# Patient Record
Sex: FEMALE | Race: OTHER | Employment: UNEMPLOYED | URBAN - METROPOLITAN AREA
[De-identification: names, ages, dates, MRNs, and addresses within clinical notes are randomized per-mention and may not be internally consistent; named-entity substitution may affect disease eponyms.]

---

## 2017-01-09 ENCOUNTER — TELEPHONE (OUTPATIENT)
Dept: PULMONOLOGY | Facility: CLINIC | Age: 57
End: 2017-01-09

## 2017-01-09 DIAGNOSIS — C73 THYROID CANCER (HCC): Primary | ICD-10-CM

## 2017-01-09 NOTE — TELEPHONE ENCOUNTER
Verbal order from Dr. Tammie Marrero to order CT scan of neck (soft tissue) NO contrast due Feb 6th 2017. Order put in [address] [place] [address] Rd. Patient's daughter notified.

## 2017-01-16 ENCOUNTER — TELEPHONE (OUTPATIENT)
Dept: OTOLARYNGOLOGY | Facility: CLINIC | Age: 57
End: 2017-01-16

## 2017-01-16 NOTE — TELEPHONE ENCOUNTER
I saw an order for a neck CT without contrast ordered on 1/9 by Yoli Jordan. Do you need another order for any scans?

## 2017-01-17 ENCOUNTER — OFFICE VISIT (OUTPATIENT)
Dept: OTOLARYNGOLOGY | Facility: CLINIC | Age: 57
End: 2017-01-17

## 2017-01-17 VITALS
WEIGHT: 118 LBS | HEIGHT: 60 IN | DIASTOLIC BLOOD PRESSURE: 70 MMHG | BODY MASS INDEX: 23.16 KG/M2 | TEMPERATURE: 97 F | SYSTOLIC BLOOD PRESSURE: 90 MMHG

## 2017-01-17 DIAGNOSIS — C80.1 PAPILLARY CARCINOMA (HCC): Primary | ICD-10-CM

## 2017-01-17 PROCEDURE — 99214 OFFICE O/P EST MOD 30 MIN: CPT | Performed by: OTOLARYNGOLOGY

## 2017-01-17 PROCEDURE — 99212 OFFICE O/P EST SF 10 MIN: CPT | Performed by: OTOLARYNGOLOGY

## 2017-01-17 NOTE — PROGRESS NOTES
Jose Prater is a 55year old female. Patient presents with:   Follow - Up: 3 month follow up-thyroid cancer- pt c/o pain at her neck since wednesday      HISTORY OF PRESENT ILLNESS    11/19/15 She presents with a history of papillary carcinoma of intervention. She is otherwise asymptomatic. No recent thyroglobulin levels noted. 10/4/16 Since I last saw her she underwent a CT scan of her neck. I personally reviewed the study and went over the results with her in the office.  She does have a previou on the initial   diagnostic scan. There is no evidence of distant disease. There is only   0.3 % uptake in the thyroid bed. IMPRESSION:   Abnormal whole body iodine scan.  There has been significant interval   increase in the activity in the right neck co measuring 10 x 13 mm. No   significant lymphadenopathy in the visualized right   clavicular region. The largest nodes in this region   measures 5 x 7 mm. VASCULATURE: Limited views are unremarkable.    SINUSES: Limited views show no significant fluid or m at 8:18 Approved by   (CST): Adela Sears MD on 9/29/2016 at 8:48     1/17/17 Last visit no Masses of the neck were noted but she did have a nuclear scan suggesting a right-sided abnormality.  She did have a CT scan demonstrating something in her right lung Constitutional Normal Overall appearance - Normal.   Psychiatric Normal Orientation - Oriented to time, place, person & situation. Appropriate mood and affect.    Neck Exam Normal Inspection - Normal. Palpation - Normal. Parotid gland - Normal. Thyroid gl

## 2017-02-02 ENCOUNTER — HOSPITAL ENCOUNTER (OUTPATIENT)
Dept: NUCLEAR MEDICINE | Facility: HOSPITAL | Age: 57
Discharge: HOME | End: 2017-02-02
Attending: INTERNAL MEDICINE
Payer: COMMERCIAL

## 2017-02-02 ENCOUNTER — HOSPITAL ENCOUNTER (OUTPATIENT)
Dept: CT IMAGING | Facility: HOSPITAL | Age: 57
Discharge: HOME | End: 2017-02-02
Attending: INTERNAL MEDICINE
Payer: COMMERCIAL

## 2017-02-02 DIAGNOSIS — C73 THYROID CANCER (HCC): ICD-10-CM

## 2017-02-02 DIAGNOSIS — C73 PAPILLARY CARCINOMA OF THYROID (HCC): ICD-10-CM

## 2017-02-02 PROCEDURE — A4216 STERILE WATER/SALINE, 10 ML: HCPCS

## 2017-02-02 PROCEDURE — 70490 CT SOFT TISSUE NECK W/O DYE: CPT

## 2017-02-02 NOTE — IMAGING NOTE
Thyrogen . 9 mg IM left glut @ 0815. Patient has had prior injection and aware of precautions. Daughter with her to interpret. Lot #[id number] exp 3/19

## 2017-02-03 ENCOUNTER — HOSPITAL ENCOUNTER (OUTPATIENT)
Dept: NUCLEAR MEDICINE | Facility: HOSPITAL | Age: 57
Discharge: HOME | End: 2017-02-03
Attending: INTERNAL MEDICINE
Payer: COMMERCIAL

## 2017-02-04 ENCOUNTER — HOSPITAL ENCOUNTER (OUTPATIENT)
Dept: NUCLEAR MEDICINE | Facility: HOSPITAL | Age: 57
Discharge: HOME | End: 2017-02-04
Attending: INTERNAL MEDICINE
Payer: COMMERCIAL

## 2017-02-06 ENCOUNTER — HOSPITAL ENCOUNTER (OUTPATIENT)
Dept: NUCLEAR MEDICINE | Facility: HOSPITAL | Age: 57
Discharge: HOME | End: 2017-02-06
Attending: INTERNAL MEDICINE
Payer: COMMERCIAL

## 2017-02-06 DIAGNOSIS — C73 THYROID CA (HCC): ICD-10-CM

## 2017-02-06 PROCEDURE — 78018 THYROID MET IMAGING BODY: CPT

## 2017-02-06 PROCEDURE — 36415 COLL VENOUS BLD VENIPUNCTURE: CPT | Performed by: NUCLEAR MEDICINE

## 2017-02-06 PROCEDURE — 86800 THYROGLOBULIN ANTIBODY: CPT | Performed by: NUCLEAR MEDICINE

## 2017-02-06 PROCEDURE — 84432 ASSAY OF THYROGLOBULIN: CPT | Performed by: NUCLEAR MEDICINE

## 2017-02-07 LAB
THYROGLOBULIN ANTIBODY: 10 IU/ML
THYROGLOBULIN TUMOR MARKER: <0.1 NG/ML

## 2017-02-13 ENCOUNTER — TELEPHONE (OUTPATIENT)
Dept: PULMONOLOGY | Facility: CLINIC | Age: 57
End: 2017-02-13

## 2017-02-13 DIAGNOSIS — R79.1 ABNORMAL COAGULATION PROFILE: ICD-10-CM

## 2017-02-13 DIAGNOSIS — J98.4 APICAL LUNG SCARRING: Primary | ICD-10-CM

## 2017-02-13 NOTE — TELEPHONE ENCOUNTER
Verbal Order from Dr. Freire Roll CT Guided Biopsy Right [place] of Lung as identified on SPECT. Order placed for PT/INR, PTT/ CBC w/ Plt and CXR. Msg routed to [facility].

## 2017-02-13 NOTE — TELEPHONE ENCOUNTER
Nikolaie 6 called re: orders for biopsy left on IR voicemail. Adelene Feeling needs orders and clarification on what is being scheduled. Msg left for [facility] to advise, no orders of defined type in epic.

## 2017-02-16 ENCOUNTER — TELEPHONE (OUTPATIENT)
Dept: ENDOCRINOLOGY CLINIC | Facility: CLINIC | Age: 57
End: 2017-02-16

## 2017-02-16 NOTE — TELEPHONE ENCOUNTER
Called and spoke with daughter Anamika Moses- there is consent to release information to daughter in chart. Let her know below. Booked for appt 3/8 in [facility]. Directions to clinic provided.

## 2017-02-16 NOTE — TELEPHONE ENCOUNTER
Received message from Dr. Karlo Nevarez regarding recurrent papillary thyroid CA in patient, she is scheduled to get repeat I-131 therapy on 2/27. Please schedule her for a consult appointment the week of March 6th. Ok to use MD approval spot. Thanks.      GRABIEL

## 2017-02-27 ENCOUNTER — TELEPHONE (OUTPATIENT)
Dept: PULMONOLOGY | Facility: CLINIC | Age: 57
End: 2017-02-27

## 2017-02-27 DIAGNOSIS — C73 RECURRENT THYROID CANCER (HCC): Primary | ICD-10-CM

## 2017-02-27 NOTE — TELEPHONE ENCOUNTER
Ok per Dr. Lujan Neighbor to enter order in [facility] for Thyroid Ablation. Order entered in system and routed to [facility] for sign off. Cash Sauceda from Radiology notified.

## 2017-02-27 NOTE — TELEPHONE ENCOUNTER
Meron/Radiology stts she needs an order for Thyroid ablation. Genemca Downer procedure is scheduled for March 1 2017. Ivan Pandya stts the procedure was changed per pt. Pt has an order for CT guided Biopsy. Please advise.

## 2017-03-01 ENCOUNTER — HOSPITAL ENCOUNTER (OUTPATIENT)
Dept: NUCLEAR MEDICINE | Facility: HOSPITAL | Age: 57
Discharge: HOME | End: 2017-03-01
Attending: INTERNAL MEDICINE
Payer: COMMERCIAL

## 2017-03-01 DIAGNOSIS — C73 RECURRENT THYROID CANCER (HCC): ICD-10-CM

## 2017-03-01 PROCEDURE — 79005 NUCLEAR RX ORAL ADMIN: CPT

## 2017-03-06 ENCOUNTER — HOSPITAL ENCOUNTER (OUTPATIENT)
Dept: NUCLEAR MEDICINE | Facility: HOSPITAL | Age: 57
Discharge: HOME | End: 2017-03-06
Attending: INTERNAL MEDICINE
Payer: COMMERCIAL

## 2017-03-06 DIAGNOSIS — C73 THYROID CANCER (HCC): ICD-10-CM

## 2017-03-06 PROCEDURE — 78018 THYROID MET IMAGING BODY: CPT

## 2017-03-08 ENCOUNTER — OFFICE VISIT (OUTPATIENT)
Dept: ENDOCRINOLOGY CLINIC | Facility: CLINIC | Age: 57
End: 2017-03-08

## 2017-03-08 VITALS
SYSTOLIC BLOOD PRESSURE: 121 MMHG | WEIGHT: 109 LBS | DIASTOLIC BLOOD PRESSURE: 74 MMHG | RESPIRATION RATE: 20 BRPM | HEIGHT: 60 IN | BODY MASS INDEX: 21.4 KG/M2 | HEART RATE: 71 BPM | TEMPERATURE: 98 F

## 2017-03-08 DIAGNOSIS — C73 THYROID CANCER (HCC): Primary | ICD-10-CM

## 2017-03-08 PROCEDURE — 99212 OFFICE O/P EST SF 10 MIN: CPT | Performed by: INTERNAL MEDICINE

## 2017-03-08 PROCEDURE — 99243 OFF/OP CNSLTJ NEW/EST LOW 30: CPT | Performed by: INTERNAL MEDICINE

## 2017-03-08 NOTE — PROGRESS NOTES
Name: Prakash Jean  Date: 3/8/2017    Referring Physician: No ref. provider found    Patient presents with:  Consult: New pt who underwent Thyroidectomy on 07/2016 due to thyroid Cancer. Radiation Therapy completed last week.  Currently on low Iodi problems  Musculoskeletal: no muscle pain or arthralgia  /Gyne: no frequency or discomfort while urinating  Psychiatric:  no acute distress, anxiety  or depression  Skin: normal moisturized skin    Medications:     Current outpatient prescriptions:   • and skin texture  Hair & Nails:  normal scalp hair     Neuro:  sensory grossly intact and motor grossly intact  Psychiatric:  oriented to time, self, and place  Nutritional:  no abnormal weight gain or loss    ASSESSMENT/PLAN:    1.  Papillary thyroid cance

## 2017-03-20 ENCOUNTER — TELEPHONE (OUTPATIENT)
Dept: PULMONOLOGY | Facility: CLINIC | Age: 57
End: 2017-03-20

## 2017-03-20 DIAGNOSIS — R91.1 PULMONARY NODULE: Primary | ICD-10-CM

## 2017-03-20 NOTE — TELEPHONE ENCOUNTER
V/O from Dr. Zavala Otto to cancel CT guided bx of the right apex lung and chest CT without contrast due April 2017 for pulmonary nodule. Biospy orders canceled.

## 2017-03-20 NOTE — TELEPHONE ENCOUNTER
Daughter Cooper Castillo notified of orders and given managed care phone # [phone]. Shruti notified managed care will do PA and call her when PA is complete. Daughter notified to call managed care if she does not hear from them within a few days.   [facility] care, p

## 2017-03-29 ENCOUNTER — TELEPHONE (OUTPATIENT)
Dept: PULMONOLOGY | Facility: CLINIC | Age: 57
End: 2017-03-29

## 2017-03-29 NOTE — TELEPHONE ENCOUNTER
After office notes and imaging results were faxed over to [id number] the CT of the chest without contrast was denied unless a peer to peer can be performed by the ordering physician by calling    [phone]     With reference # [id number]    Thank You

## 2017-03-29 NOTE — TELEPHONE ENCOUNTER
Spoke to Durene Severin [facility], PA may have been denied for being too early. Pt is not due for Chest CT until 4-20-17. We will forward PA request to [facility] closer to due date. Thank you.

## 2017-04-24 ENCOUNTER — APPOINTMENT (OUTPATIENT)
Dept: LAB | Facility: HOSPITAL | Age: 57
End: 2017-04-24
Attending: INTERNAL MEDICINE
Payer: COMMERCIAL

## 2017-04-24 ENCOUNTER — HOSPITAL ENCOUNTER (OUTPATIENT)
Dept: ULTRASOUND IMAGING | Facility: HOSPITAL | Age: 57
Discharge: HOME | End: 2017-04-24
Attending: INTERNAL MEDICINE
Payer: COMMERCIAL

## 2017-04-24 DIAGNOSIS — C73 THYROID CANCER (HCC): ICD-10-CM

## 2017-04-24 PROCEDURE — 86800 THYROGLOBULIN ANTIBODY: CPT

## 2017-04-24 PROCEDURE — 36415 COLL VENOUS BLD VENIPUNCTURE: CPT

## 2017-04-24 PROCEDURE — 84439 ASSAY OF FREE THYROXINE: CPT

## 2017-04-24 PROCEDURE — 84432 ASSAY OF THYROGLOBULIN: CPT

## 2017-04-24 PROCEDURE — 76536 US EXAM OF HEAD AND NECK: CPT

## 2017-04-24 PROCEDURE — 84443 ASSAY THYROID STIM HORMONE: CPT

## 2017-05-10 ENCOUNTER — OFFICE VISIT (OUTPATIENT)
Dept: ENDOCRINOLOGY CLINIC | Facility: CLINIC | Age: 57
End: 2017-05-10

## 2017-05-10 VITALS
BODY MASS INDEX: 21.52 KG/M2 | SYSTOLIC BLOOD PRESSURE: 118 MMHG | WEIGHT: 109.63 LBS | DIASTOLIC BLOOD PRESSURE: 72 MMHG | HEART RATE: 79 BPM | HEIGHT: 60 IN

## 2017-05-10 DIAGNOSIS — Z85.850 HISTORY OF THYROID CANCER: ICD-10-CM

## 2017-05-10 DIAGNOSIS — E89.0 POSTOPERATIVE HYPOTHYROIDISM: Primary | ICD-10-CM

## 2017-05-10 PROCEDURE — 99212 OFFICE O/P EST SF 10 MIN: CPT | Performed by: INTERNAL MEDICINE

## 2017-05-10 PROCEDURE — 99213 OFFICE O/P EST LOW 20 MIN: CPT | Performed by: INTERNAL MEDICINE

## 2017-05-10 NOTE — PROGRESS NOTES
Name: Elio Baez  Date: 5/10/2017    Referring Physician: No ref.  provider found    Patient presents with:  Thyroid Problem: thyroid cancer      HISTORY OF PRESENT ILLNESS   Elio Baez is a 65year old female who presents for Patient prescriptions:   •  Levothyroxine Sodium (SYNTHROID, LEVOTHROID) 125 MCG Oral Tab, Take 112 mcg by mouth before breakfast.  , Disp: , Rfl:   •  Atorvastatin Calcium (LIPITOR) 10 MG Oral Tab, Take 10 mg by mouth nightly., Disp: , Rfl:      Allergies:      Io importance of long term follow up with thyroglobulin level and thyroid ultrasound  -Discussed importance of long term TSH suppression  -She has now received 2nd dose of LINARES due to recurrence  -Goal TSH <0.1 given stage 3 disease  -Continue LT4 112mcg PO da

## 2017-09-12 ENCOUNTER — TELEPHONE (OUTPATIENT)
Dept: PULMONOLOGY | Facility: CLINIC | Age: 57
End: 2017-09-12

## 2017-09-12 NOTE — TELEPHONE ENCOUNTER
Pts daugher indicates pt received a letter for CT Chest orders and daughter wants clarification if the orders are in or do they need to be put in by Dr. Abigail Mayer? Pls call at:[phone],thanks.

## 2017-09-13 NOTE — TELEPHONE ENCOUNTER
Informed pt's daughter of below. Explained pa required for CT, Managed Care will contact pt once pa obtained to notify her to proceed in scheduling CT, & they may f/u w/ Managed Care in 10 business days if they don't hear from them.  Phone #s for [facility]

## 2017-09-13 NOTE — TELEPHONE ENCOUNTER
Requested that Ty in [facility] work on obtaining prior [facility]. Explained rx was never cancelled for CT Chest ordered 3/21/17 & to let us know if she needs a new rx b/c they cancelled referral. She verbalized understanding & was agreeable.

## 2017-10-01 ENCOUNTER — HOSPITAL ENCOUNTER (OUTPATIENT)
Dept: CT IMAGING | Facility: HOSPITAL | Age: 57
Discharge: HOME | End: 2017-10-01
Attending: INTERNAL MEDICINE
Payer: COMMERCIAL

## 2017-10-01 DIAGNOSIS — R91.1 PULMONARY NODULE: ICD-10-CM

## 2017-10-01 PROCEDURE — 71250 CT THORAX DX C-: CPT | Performed by: INTERNAL MEDICINE

## 2017-10-05 ENCOUNTER — TELEPHONE (OUTPATIENT)
Dept: PULMONOLOGY | Facility: CLINIC | Age: 57
End: 2017-10-05

## 2017-10-05 DIAGNOSIS — R91.1 PULMONARY NODULE, LEFT: Primary | ICD-10-CM

## 2017-10-05 DIAGNOSIS — Z86.11 HISTORY OF TB (TUBERCULOSIS): ICD-10-CM

## 2017-10-06 NOTE — TELEPHONE ENCOUNTER
RN, I spoke with the patient's daughter regarding the results of the test.  She acts as an  for the mother. The CAT scan is stable.   Please add to the calendar a repeat CT scan of the chest at the one-year interval.

## 2017-11-11 ENCOUNTER — APPOINTMENT (OUTPATIENT)
Dept: LAB | Facility: HOSPITAL | Age: 57
End: 2017-11-11
Attending: INTERNAL MEDICINE
Payer: COMMERCIAL

## 2017-11-11 ENCOUNTER — HOSPITAL ENCOUNTER (OUTPATIENT)
Dept: ULTRASOUND IMAGING | Facility: HOSPITAL | Age: 57
Discharge: HOME | End: 2017-11-11
Attending: INTERNAL MEDICINE
Payer: COMMERCIAL

## 2017-11-11 DIAGNOSIS — E89.0 POSTOPERATIVE HYPOTHYROIDISM: ICD-10-CM

## 2017-11-11 DIAGNOSIS — Z85.850 HISTORY OF THYROID CANCER: ICD-10-CM

## 2017-11-11 PROCEDURE — 86800 THYROGLOBULIN ANTIBODY: CPT

## 2017-11-11 PROCEDURE — 36415 COLL VENOUS BLD VENIPUNCTURE: CPT

## 2017-11-11 PROCEDURE — 84443 ASSAY THYROID STIM HORMONE: CPT

## 2017-11-11 PROCEDURE — 84432 ASSAY OF THYROGLOBULIN: CPT

## 2017-11-11 PROCEDURE — 76536 US EXAM OF HEAD AND NECK: CPT | Performed by: INTERNAL MEDICINE

## 2017-11-11 PROCEDURE — 84439 ASSAY OF FREE THYROXINE: CPT

## 2017-11-15 ENCOUNTER — OFFICE VISIT (OUTPATIENT)
Dept: ENDOCRINOLOGY CLINIC | Facility: CLINIC | Age: 57
End: 2017-11-15

## 2017-11-15 VITALS
HEIGHT: 60 IN | HEART RATE: 73 BPM | SYSTOLIC BLOOD PRESSURE: 138 MMHG | BODY MASS INDEX: 21.67 KG/M2 | WEIGHT: 110.38 LBS | DIASTOLIC BLOOD PRESSURE: 80 MMHG

## 2017-11-15 DIAGNOSIS — C73 THYROID CANCER (HCC): ICD-10-CM

## 2017-11-15 DIAGNOSIS — E89.0 POSTOPERATIVE HYPOTHYROIDISM: Primary | ICD-10-CM

## 2017-11-15 PROCEDURE — 99214 OFFICE O/P EST MOD 30 MIN: CPT | Performed by: INTERNAL MEDICINE

## 2017-11-15 PROCEDURE — 99212 OFFICE O/P EST SF 10 MIN: CPT | Performed by: INTERNAL MEDICINE

## 2017-11-15 NOTE — PROGRESS NOTES
Name: Edy Abernathy  Date: 11/15/2017    Referring Physician: No ref.  provider found    Patient presents with:  Thyroid Problem: Cancer      HISTORY OF PRESENT ILLNESS   Edy Abernathy is a 65year old female who presents for Patient present (LIPITOR) 10 MG Oral Tab, Take 10 mg by mouth nightly., Disp: , Rfl:      Allergies:     Iodinated Diagnosti*    Hives    Comment:Patient broke out in hives after contrast iv dye             was given    Social History:   Social History    Marital status: thyroid ultrasound  -Discussed importance of long term TSH suppression  -She has now received 2nd dose of LINARES due to recurrence  -Goal TSH <0.1 given stage 3 disease  -Continue LT4 112mcg PO daily  -TFTs at goal  -TG ab level has started to increase in the

## 2017-12-14 ENCOUNTER — HOSPITAL ENCOUNTER (OUTPATIENT)
Dept: NUCLEAR MEDICINE | Facility: HOSPITAL | Age: 57
Discharge: HOME | End: 2017-12-14
Attending: INTERNAL MEDICINE
Payer: COMMERCIAL

## 2017-12-14 DIAGNOSIS — E89.0 POSTOPERATIVE HYPOTHYROIDISM: ICD-10-CM

## 2017-12-14 DIAGNOSIS — C73 THYROID CANCER (HCC): ICD-10-CM

## 2017-12-14 NOTE — IMAGING NOTE
Pt here with dtr Jose. Daughter  to assist with translation. Has been off thyroid meds since Monday thyrogen lot [id number] exp mar 2019 0.9 milligrams given im left gluteal region no c/o.  Dtr verbalizes  Mom having a contrast allergy instructed dtcelio and vera

## 2017-12-15 ENCOUNTER — HOSPITAL ENCOUNTER (OUTPATIENT)
Dept: NUCLEAR MEDICINE | Facility: HOSPITAL | Age: 57
Discharge: HOME | End: 2017-12-15
Attending: INTERNAL MEDICINE
Payer: COMMERCIAL

## 2017-12-15 PROCEDURE — A4216 STERILE WATER/SALINE, 10 ML: HCPCS

## 2017-12-15 NOTE — IMAGING NOTE
Ddday #2 thyrogen injection given to right upper outer gluteal. Pt sat for several minutes post injection, with no adverse signs and symptoms.  Thyrogen lot # [id number] with exp date mar 2017

## 2017-12-16 ENCOUNTER — HOSPITAL ENCOUNTER (OUTPATIENT)
Dept: NUCLEAR MEDICINE | Facility: HOSPITAL | Age: 57
Discharge: HOME | End: 2017-12-16
Attending: INTERNAL MEDICINE
Payer: COMMERCIAL

## 2017-12-18 ENCOUNTER — HOSPITAL ENCOUNTER (OUTPATIENT)
Dept: NUCLEAR MEDICINE | Facility: HOSPITAL | Age: 57
Discharge: HOME | End: 2017-12-18
Attending: INTERNAL MEDICINE
Payer: COMMERCIAL

## 2017-12-18 DIAGNOSIS — C73 THYROID CANCER (HCC): ICD-10-CM

## 2017-12-18 PROCEDURE — 78018 THYROID MET IMAGING BODY: CPT | Performed by: INTERNAL MEDICINE

## 2017-12-18 PROCEDURE — 86800 THYROGLOBULIN ANTIBODY: CPT | Performed by: NUCLEAR MEDICINE

## 2017-12-18 PROCEDURE — 78020 THYROID MET UPTAKE: CPT | Performed by: INTERNAL MEDICINE

## 2017-12-18 PROCEDURE — 84432 ASSAY OF THYROGLOBULIN: CPT | Performed by: NUCLEAR MEDICINE

## 2017-12-18 PROCEDURE — 36415 COLL VENOUS BLD VENIPUNCTURE: CPT | Performed by: NUCLEAR MEDICINE

## 2017-12-21 ENCOUNTER — TELEPHONE (OUTPATIENT)
Dept: ENDOCRINOLOGY CLINIC | Facility: CLINIC | Age: 57
End: 2017-12-21

## 2017-12-21 NOTE — TELEPHONE ENCOUNTER
Called patient with [address]  [place] [phone]. Spoke with her daughter. Per chart ok to release information to her daughter. She is happy with tumor marker results. She states no, Dr. Delores Spencer did not give her the results of the scan.

## 2017-12-21 NOTE — TELEPHONE ENCOUNTER
Please call patient - good news, tumor marker did not increase after stimulation for WBS. Did Dr. Delores Spencer give her the results of the scan during her visit?

## 2018-01-04 ENCOUNTER — TELEPHONE (OUTPATIENT)
Dept: ENDOCRINOLOGY CLINIC | Facility: CLINIC | Age: 58
End: 2018-01-04

## 2018-01-04 DIAGNOSIS — Z85.850 HISTORY OF THYROID CANCER: Primary | ICD-10-CM

## 2018-01-04 NOTE — TELEPHONE ENCOUNTER
Called patient with [address]  [place] [id number]. Her daughter, Duy Maxwell, answered. Informed her of Dr. Terrell Scarce message below. She is requesting a copy of the lab orders. Mailed to the patient today.

## 2018-01-04 NOTE — TELEPHONE ENCOUNTER
Please call patient or daughter - WBS was stable - she does have a small portion of uptake in her lung but suspect that this might be due to old scarring.   No need for further evaluation at this time and recommend repeat TSH, FT4, thyroglobulin level in 6

## 2018-05-08 ENCOUNTER — TELEPHONE (OUTPATIENT)
Dept: ENDOCRINOLOGY CLINIC | Facility: CLINIC | Age: 58
End: 2018-05-08

## 2018-05-08 DIAGNOSIS — E89.0 POSTOPERATIVE HYPOTHYROIDISM: Primary | ICD-10-CM

## 2018-05-08 NOTE — TELEPHONE ENCOUNTER
Pts daughter state that she tried to schedule [place] thyroid scan but the order was . Please call. Aware SH out of office.

## 2018-07-24 ENCOUNTER — TELEPHONE (OUTPATIENT)
Dept: ENDOCRINOLOGY CLINIC | Facility: CLINIC | Age: 58
End: 2018-07-24

## 2018-07-24 NOTE — TELEPHONE ENCOUNTER
Per Jazmín/Vini, pt has nuc med thyroid test scheduled for 7/26/18 and test needs prior auth. Please call Mary Lenz once prior Melissa Hint is obtained.

## 2018-07-24 NOTE — TELEPHONE ENCOUNTER
Contacted [facility]. Spoke with representative Lanre Aggarwal. Had him check CPT codes for NM scan 33267 and 72821. No Precertification required for either code. Reference for call is [facility] 7/24/18 2:56pm.     [facility] and provided her this information.

## 2018-07-26 ENCOUNTER — HOSPITAL ENCOUNTER (OUTPATIENT)
Dept: NUCLEAR MEDICINE | Facility: HOSPITAL | Age: 58
Discharge: HOME | End: 2018-07-26
Attending: INTERNAL MEDICINE
Payer: COMMERCIAL

## 2018-07-26 DIAGNOSIS — E89.0 POSTOPERATIVE HYPOTHYROIDISM: ICD-10-CM

## 2018-07-27 ENCOUNTER — HOSPITAL ENCOUNTER (OUTPATIENT)
Dept: NUCLEAR MEDICINE | Facility: HOSPITAL | Age: 58
Discharge: HOME | End: 2018-07-27
Attending: INTERNAL MEDICINE
Payer: COMMERCIAL

## 2018-07-27 PROCEDURE — A4216 STERILE WATER/SALINE, 10 ML: HCPCS

## 2018-07-28 ENCOUNTER — HOSPITAL ENCOUNTER (OUTPATIENT)
Dept: NUCLEAR MEDICINE | Facility: HOSPITAL | Age: 58
Discharge: HOME | End: 2018-07-28
Attending: INTERNAL MEDICINE
Payer: COMMERCIAL

## 2018-07-30 ENCOUNTER — HOSPITAL ENCOUNTER (OUTPATIENT)
Dept: NUCLEAR MEDICINE | Facility: HOSPITAL | Age: 58
Discharge: HOME | End: 2018-07-30
Attending: INTERNAL MEDICINE
Payer: COMMERCIAL

## 2018-07-30 PROCEDURE — 78018 THYROID MET IMAGING BODY: CPT | Performed by: INTERNAL MEDICINE

## 2018-07-30 PROCEDURE — 78020 THYROID MET UPTAKE: CPT | Performed by: INTERNAL MEDICINE

## 2018-08-02 ENCOUNTER — TELEPHONE (OUTPATIENT)
Dept: ENDOCRINOLOGY CLINIC | Facility: CLINIC | Age: 58
End: 2018-08-02

## 2018-08-02 DIAGNOSIS — Z85.850 HISTORY OF THYROID CANCER: Primary | ICD-10-CM

## 2018-08-02 NOTE — TELEPHONE ENCOUNTER
Please call patient - her thyroglobulin level continues to be elevated but scan is stable. Lets repeat TSH, FT4, thyroglobulin level  In 6 months to monitor.

## 2018-08-06 NOTE — TELEPHONE ENCOUNTER
Spoke with patient's daughter, Carla Means. Informed her of Dr. Bird Cosby message below. She verbalized her understanding. Lab orders entered.

## 2018-09-26 ENCOUNTER — TELEPHONE (OUTPATIENT)
Dept: PULMONOLOGY | Facility: CLINIC | Age: 58
End: 2018-09-26

## 2019-01-17 ENCOUNTER — TELEPHONE (OUTPATIENT)
Dept: PULMONOLOGY | Facility: CLINIC | Age: 59
End: 2019-01-17

## 2019-01-18 NOTE — TELEPHONE ENCOUNTER
Spoke to to Andrey jauregui from Managed care. Informed of issue. States will be working on [place]. Message routed to Andrey jauregui. Andrey jauregui- Thank you so much for you help!

## 2019-01-18 NOTE — TELEPHONE ENCOUNTER
Hi,   I spoke with [facility]. No clinicals required. [facility] states once they have contacted the pt with the option of choosing another facility, and if the pt chooses [facility] will fax the Authorization # to [facility] @ [phone]. [facility] Case # [id number]. I'm watching for a fax from [facility].

## 2019-01-19 ENCOUNTER — HOSPITAL ENCOUNTER (OUTPATIENT)
Dept: CT IMAGING | Facility: HOSPITAL | Age: 59
Discharge: HOME | End: 2019-01-19
Attending: INTERNAL MEDICINE
Payer: COMMERCIAL

## 2019-01-19 ENCOUNTER — APPOINTMENT (OUTPATIENT)
Dept: LAB | Facility: HOSPITAL | Age: 59
End: 2019-01-19
Attending: INTERNAL MEDICINE
Payer: COMMERCIAL

## 2019-01-19 DIAGNOSIS — R91.1 PULMONARY NODULE, LEFT: ICD-10-CM

## 2019-01-19 DIAGNOSIS — Z85.850 HISTORY OF THYROID CANCER: ICD-10-CM

## 2019-01-19 DIAGNOSIS — Z86.11 HISTORY OF TB (TUBERCULOSIS): ICD-10-CM

## 2019-01-19 LAB
T4 FREE SERPL-MCNC: 1.33 NG/DL (ref 0.58–1.64)
TSH SERPL-ACNC: 0.14 UIU/ML (ref 0.45–5.33)

## 2019-01-19 PROCEDURE — 84443 ASSAY THYROID STIM HORMONE: CPT

## 2019-01-19 PROCEDURE — 36415 COLL VENOUS BLD VENIPUNCTURE: CPT

## 2019-01-19 PROCEDURE — 84439 ASSAY OF FREE THYROXINE: CPT

## 2019-01-19 PROCEDURE — 71250 CT THORAX DX C-: CPT | Performed by: INTERNAL MEDICINE

## 2019-01-19 PROCEDURE — 86800 THYROGLOBULIN ANTIBODY: CPT

## 2019-01-19 PROCEDURE — 84432 ASSAY OF THYROGLOBULIN: CPT

## 2019-01-21 ENCOUNTER — TELEPHONE (OUTPATIENT)
Dept: PULMONOLOGY | Facility: CLINIC | Age: 59
End: 2019-01-21

## 2019-01-21 DIAGNOSIS — R91.1 PULMONARY NODULE: Primary | ICD-10-CM

## 2019-01-21 LAB
THYROGLOBULIN ANTIBODY: 148 IU/ML
THYROGLOBULIN TUMOR MARKER: <0.1 NG/ML

## 2019-01-21 NOTE — TELEPHONE ENCOUNTER
----- Message from Javier Celestin MD sent at 1/19/2019  4:12 PM CST -----  RN, please call the patient to let her know that her CT scan the chest is stable.   Please add to the calendar a repeat CT scan the chest at the one-year interval.

## 2019-01-22 NOTE — TELEPHONE ENCOUNTER
Pt daughter Rabia Caller informed of [facility] message below. Pt daughter voiced understanding. CT of the chest placed in C. C for JAN 2020.

## 2019-01-28 ENCOUNTER — TELEPHONE (OUTPATIENT)
Dept: ENDOCRINOLOGY CLINIC | Facility: CLINIC | Age: 59
End: 2019-01-28

## 2019-01-28 DIAGNOSIS — E89.0 POSTOPERATIVE HYPOTHYROIDISM: Primary | ICD-10-CM

## 2019-01-28 NOTE — TELEPHONE ENCOUNTER
Spoke with daughter and informed her of results. Understands plan to continue taking current dose of LT4 and repeat labs in 6 months. Orders placed.

## 2019-01-28 NOTE — TELEPHONE ENCOUNTER
Please call patient - good news, thyroid levels are at goal.  Continue current dose of medication and plan to repeat TSH, FT4, Thyroglobulin level in 6 months. Thanks.

## 2019-08-10 ENCOUNTER — APPOINTMENT (OUTPATIENT)
Dept: LAB | Facility: HOSPITAL | Age: 59
End: 2019-08-10
Attending: INTERNAL MEDICINE
Payer: COMMERCIAL

## 2019-08-10 DIAGNOSIS — E89.0 POSTOPERATIVE HYPOTHYROIDISM: ICD-10-CM

## 2019-08-10 LAB
T4 FREE SERPL-MCNC: 1.7 NG/DL (ref 0.8–1.7)
TSI SER-ACNC: 0.01 MIU/ML (ref 0.36–3.74)

## 2019-08-10 PROCEDURE — 86800 THYROGLOBULIN ANTIBODY: CPT

## 2019-08-10 PROCEDURE — 84439 ASSAY OF FREE THYROXINE: CPT

## 2019-08-10 PROCEDURE — 36415 COLL VENOUS BLD VENIPUNCTURE: CPT

## 2019-08-10 PROCEDURE — 84432 ASSAY OF THYROGLOBULIN: CPT

## 2019-08-10 PROCEDURE — 84443 ASSAY THYROID STIM HORMONE: CPT

## 2019-08-13 LAB
THYROGLOBULIN ANTIBODY: 155 IU/ML
THYROGLOBULIN TUMOR MARKER: <0.1 NG/ML

## 2019-08-14 ENCOUNTER — TELEPHONE (OUTPATIENT)
Dept: ENDOCRINOLOGY CLINIC | Facility: CLINIC | Age: 59
End: 2019-08-14

## 2019-08-14 DIAGNOSIS — C73 THYROID CANCER (HCC): Primary | ICD-10-CM

## 2019-08-14 NOTE — TELEPHONE ENCOUNTER
Please call patient - her thyroglobulin antibody level has continued to increase over the past 6 months. Lets repeat her I-131 WBS to evaluate if there is recurrence of thyroid cancer. Thanks.

## 2019-08-15 NOTE — TELEPHONE ENCOUNTER
PA needed     RN spoke roxanne Amador [facility]) who started crying on the phone and states she is scared for her mom. Order for WBS is in [address] but needs a PA. Shruti verbalized understanding we would work on PA and call once approved so pt can schedule.  Pt still has A

## 2019-08-16 NOTE — TELEPHONE ENCOUNTER
Called pt and spoke with daughter Luis Champion (ok per HIPAA) informed her that no PA is needed and they can call to schedule. Verbalizes understanding.

## 2019-08-16 NOTE — TELEPHONE ENCOUNTER
Contacted number on back of [employer] card (verified active coverage on phone) and followed prompts for pre authorization. I spoke with [place] and was informed pre authorization is not handled by [facility] and to contact plan directly.  [place] advised to

## 2019-08-16 NOTE — TELEPHONE ENCOUNTER
Gary Gorman - Pts daughter Jose D Lipoma called back and was advise that no prior auth required and she states that she will call [employer].

## 2019-08-16 NOTE — TELEPHONE ENCOUNTER
LMTCB please inform daughter that NO PA required for testing and to contact central scheduling to schedule appt.  Thank you

## 2019-10-07 DIAGNOSIS — C73 THYROID CANCER (HCC): Primary | ICD-10-CM

## 2019-10-28 ENCOUNTER — HOSPITAL ENCOUNTER (OUTPATIENT)
Dept: NUCLEAR MEDICINE | Facility: HOSPITAL | Age: 59
Discharge: HOME | End: 2019-10-28
Attending: INTERNAL MEDICINE
Payer: COMMERCIAL

## 2019-10-28 ENCOUNTER — TELEPHONE (OUTPATIENT)
Dept: ENDOCRINOLOGY CLINIC | Facility: CLINIC | Age: 59
End: 2019-10-28

## 2019-10-28 DIAGNOSIS — C73 THYROID CANCER (HCC): ICD-10-CM

## 2019-10-28 PROCEDURE — 78018 THYROID MET IMAGING BODY: CPT | Performed by: INTERNAL MEDICINE

## 2019-10-28 NOTE — TELEPHONE ENCOUNTER
Unable to start a case for this CPT code on [employer]. Called [facility] at [phone] and per automated system no PA is required. Transferred to a representative to be sure and spoke with Arline. No PA required. Call reference # Arline SUMNER. 10/28//2019 @ 4:55PM EST    Called and LVM for Charisse Love with Insurance Verification to update.

## 2019-10-28 NOTE — TELEPHONE ENCOUNTER
Emily White/Insurance verification states PA is needed for NW THYROID.  Tristan Ragland states PA must be completed by 5pm today for pt to have test done at 7am. Please call [phone]

## 2019-10-29 ENCOUNTER — HOSPITAL ENCOUNTER (OUTPATIENT)
Dept: NUCLEAR MEDICINE | Facility: HOSPITAL | Age: 59
Discharge: HOME | End: 2019-10-29
Attending: INTERNAL MEDICINE
Payer: COMMERCIAL

## 2019-10-29 NOTE — IMAGING NOTE
Identified patient with full name and . Allergies reviewed. Injection explained. 0.9 mg thyrogen mixed with 1 ml sterile water. Right upper quadrant gluteus cleaned with alcohol. 1 ml thyrogen solution injected with 25 gauge needle.  No bleeding or pain

## 2019-10-30 ENCOUNTER — HOSPITAL ENCOUNTER (OUTPATIENT)
Dept: NUCLEAR MEDICINE | Facility: HOSPITAL | Age: 59
Discharge: HOME | End: 2019-10-30
Attending: INTERNAL MEDICINE
Payer: COMMERCIAL

## 2019-11-01 ENCOUNTER — HOSPITAL ENCOUNTER (OUTPATIENT)
Dept: NUCLEAR MEDICINE | Facility: HOSPITAL | Age: 59
Discharge: HOME | End: 2019-11-01
Attending: INTERNAL MEDICINE
Payer: COMMERCIAL

## 2019-11-01 ENCOUNTER — TELEPHONE (OUTPATIENT)
Dept: ENDOCRINOLOGY CLINIC | Facility: CLINIC | Age: 59
End: 2019-11-01

## 2019-11-01 DIAGNOSIS — C73 THYROID CANCER (HCC): Primary | ICD-10-CM

## 2019-11-01 NOTE — TELEPHONE ENCOUNTER
Please call patient - discussed imaging today with Dr. Antonette Baker which is inconclusive at this time. After discussion recommend PET/CT scan to further evaluate.

## 2019-11-05 ENCOUNTER — TELEPHONE (OUTPATIENT)
Dept: PULMONOLOGY | Facility: CLINIC | Age: 59
End: 2019-11-05

## 2019-11-05 NOTE — TELEPHONE ENCOUNTER
Hello,    Patient's health plan has denied PA request for CT. Per health plan clinical does not meet medical guidelines. A detail denial letter has been faxed to [phone]. Patient has been informed to contact office for redirection of care. Thank you, Danitza Devries Specialist    [facility] Care.

## 2019-11-08 ENCOUNTER — HOSPITAL ENCOUNTER (OUTPATIENT)
Dept: NUCLEAR MEDICINE | Facility: HOSPITAL | Age: 59
Discharge: HOME | End: 2019-11-08
Attending: INTERNAL MEDICINE
Payer: COMMERCIAL

## 2019-11-08 DIAGNOSIS — C73 THYROID CANCER (HCC): ICD-10-CM

## 2019-11-08 PROCEDURE — 82962 GLUCOSE BLOOD TEST: CPT

## 2019-11-08 PROCEDURE — 78815 PET IMAGE W/CT SKULL-THIGH: CPT | Performed by: INTERNAL MEDICINE

## 2019-11-09 ENCOUNTER — TELEPHONE (OUTPATIENT)
Dept: ENDOCRINOLOGY CLINIC | Facility: CLINIC | Age: 59
End: 2019-11-09

## 2019-11-09 NOTE — TELEPHONE ENCOUNTER
Spoke with Alexia. Informed of providers note. Pt understood and aware to repeat blood work in 6 months. Pt had no further questions.

## 2019-11-09 NOTE — TELEPHONE ENCOUNTER
LMTCB for daughter     Please call patient - good news, PET scan was negative for recurrent thyroid cancer. The abnormal uptake in lungs is likely just scarring. Will plan to monitor with repeat blood work in 6 months. Thank you.

## 2019-12-06 ENCOUNTER — TELEPHONE (OUTPATIENT)
Dept: ENDOCRINOLOGY CLINIC | Facility: CLINIC | Age: 59
End: 2019-12-06

## 2019-12-06 NOTE — TELEPHONE ENCOUNTER
Pt accounts looking for pts clinical notes in regards to CT Scan for proc code [id number], which was done on 11/18/2019. Need Retro Auth.

## 2019-12-06 NOTE — TELEPHONE ENCOUNTER
Yes, ok to draft letter. She has positive thyroid tumor marker and negative thyroid whole body scan. Concern for non-iodine avid thyroid cancer that has metastasized.

## 2019-12-06 NOTE — TELEPHONE ENCOUNTER
Letter signed by [facility] and available in Epic. LM for Armond Charles - let her know that last office visit was 11/2017 and this note is available in Epic, too. LMTCB.

## 2019-12-18 ENCOUNTER — TELEPHONE (OUTPATIENT)
Dept: PULMONOLOGY | Facility: CLINIC | Age: 59
End: 2019-12-18

## 2019-12-18 NOTE — TELEPHONE ENCOUNTER
Letter sent via [facility] and mailed to pt regarding Ct chest due in 1/2020    Managed care please obtain PA

## 2020-01-31 ENCOUNTER — TELEPHONE (OUTPATIENT)
Dept: PULMONOLOGY | Facility: CLINIC | Age: 60
End: 2020-01-31

## 2020-01-31 DIAGNOSIS — R91.1 PULMONARY NODULE: Primary | ICD-10-CM

## 2020-01-31 NOTE — TELEPHONE ENCOUNTER
[facility] Scheduling states that pt is scheduled for 20 for Ct of chest and order is . Please update order in Epic.

## 2020-01-31 NOTE — TELEPHONE ENCOUNTER
Carmie Every from central scheduling calling to request new order, order is only good for 1 year, thanks.

## 2020-02-22 ENCOUNTER — HOSPITAL ENCOUNTER (OUTPATIENT)
Dept: CT IMAGING | Facility: HOSPITAL | Age: 60
Discharge: HOME | End: 2020-02-22
Attending: INTERNAL MEDICINE
Payer: COMMERCIAL

## 2020-02-22 DIAGNOSIS — R91.1 PULMONARY NODULE: ICD-10-CM

## 2020-02-22 PROCEDURE — 71250 CT THORAX DX C-: CPT | Performed by: INTERNAL MEDICINE

## 2020-03-13 ENCOUNTER — TELEPHONE (OUTPATIENT)
Dept: PULMONOLOGY | Facility: CLINIC | Age: 60
End: 2020-03-13

## 2020-03-13 NOTE — TELEPHONE ENCOUNTER
Patient due for Chest CT in March 2021 for Chronic Calendar PA. Letter sent. Routed to [facility].

## 2021-07-20 ENCOUNTER — TELEPHONE (OUTPATIENT)
Dept: PULMONOLOGY | Facility: CLINIC | Age: 61
End: 2021-07-20

## 2022-01-20 ENCOUNTER — TELEPHONE (OUTPATIENT)
Dept: PULMONOLOGY | Facility: CLINIC | Age: 62
End: 2022-01-20

## 2024-02-17 ENCOUNTER — HOSPITAL ENCOUNTER (OUTPATIENT)
Dept: MAMMOGRAPHY | Facility: HOSPITAL | Age: 64
Discharge: HOME | End: 2024-02-17
Attending: FAMILY MEDICINE
Payer: COMMERCIAL

## 2024-02-17 DIAGNOSIS — Z12.31 ENCOUNTER FOR SCREENING MAMMOGRAM FOR MALIGNANT NEOPLASM OF BREAST: ICD-10-CM

## 2024-02-17 PROCEDURE — 77063 BREAST TOMOSYNTHESIS BI: CPT | Performed by: FAMILY MEDICINE

## 2024-02-17 PROCEDURE — 77067 SCR MAMMO BI INCL CAD: CPT | Performed by: FAMILY MEDICINE

## 2024-07-30 ENCOUNTER — OFFICE VISIT (OUTPATIENT)
Facility: LOCATION | Age: 64
End: 2024-07-30

## 2024-07-30 DIAGNOSIS — M54.2 CERVICALGIA: ICD-10-CM

## 2024-07-30 DIAGNOSIS — C73 THYROID CANCER (HCC): ICD-10-CM

## 2024-07-30 DIAGNOSIS — R22.1 NECK NODULE: Primary | ICD-10-CM

## 2024-07-30 PROCEDURE — 99203 OFFICE O/P NEW LOW 30 MIN: CPT | Performed by: STUDENT IN AN ORGANIZED HEALTH CARE EDUCATION/TRAINING PROGRAM

## 2024-07-30 PROCEDURE — 31575 DIAGNOSTIC LARYNGOSCOPY: CPT | Performed by: STUDENT IN AN ORGANIZED HEALTH CARE EDUCATION/TRAINING PROGRAM

## 2024-07-30 NOTE — PROGRESS NOTES
[place]  OTOLARYNGOLOGY - HEAD & NECK SURGERY    2024     Reason for Consultation:   Neck nodule    History of Present Illness:   Patient is a pleasant 64 year old female with a history of papillary thyroid cancer which was treated surgically in  by Dr. Garcia.  She was then treated postoperatively with radioactive iodine.  She has since then been followed with thyroglobulin antibody levels and has had PET/CT done in 2019 which did not reveal any recurrent or metastatic disease.  Since then she has been doing well and has been following up for checks every 6 months.  She states that they have been checking her thyroglobulin levels and these have not changed over the past few years.  She has not had any recent imaging.  She does see Dr. Smith and was sent to see me as she has a palpable nodule which is superficial in the right neck just anterior to the sternocleidomastoid muscle.  She states that this area does not bother her and she is not having any pain from the area.  She states she does have some tightness when she is stretching her back in this area.  She has not had any changes in voice, no difficulty swallowing or eating.    Past Medical History  Past Medical History:    Other and unspecified hyperlipidemia    TB (tuberculosis)    pt was treated for tb in the     Thyroid cancer (HCC)       Past Surgical History  Past Surgical History:   Procedure Laterality Date          Thyroidectomy  2015       Family History  Family History   Problem Relation Age of Onset    Thyroid Cancer Self     Diabetes Mother     Diabetes Sister     Diabetes Brother        Social History  Pediatric History   Patient Parents    Not on file     Other Topics Concern    Not on file   Social History Narrative    Not on file           Current Medications:  Current Outpatient Medications   Medication Sig Dispense Refill    Levothyroxine Sodium (SYNTHROID, LEVOTHROID) 125 MCG Oral Tab Take 112 mcg by mouth before  breakfast.        Atorvastatin Calcium (LIPITOR) 10 MG Oral Tab Take 10 mg by mouth nightly.         Allergies  Allergies   Allergen Reactions    Iodinated Diagnostic Agents [Radiology Contrast Iodinated Dyes] HIVES     Patient broke out in hives after contrast iv dye was given        Review of Systems:   A comprehensive 10 point review of systems was completed.  Pertinent positives and negatives noted in the the HPI.    Physical Exam:   There were no vitals taken for this visit.    GENERAL: No acute distress, Comfortable appearing  FACE: HB 1/6, Normal Animation  HEAD: Normocephalic  EYES: EOMI, pupils equil  EARS: Bilateral Auricles Symmetric, bilateral tympanic membranes normal  NOSE: Nares patent bilaterally  ORAL CAVITY: Tongue mobile, Oropharynx clear, Floor of mouth clear, Posterior oropharynx normal  NECK: There is a small palpable nodule just anterior to the border of the right sternocleidomastoid muscle which is superficial.  It is soft and freely mobile.  No tenderness.  Thyroidectomy scar is well-healed.  There is no fullness in the area of the thyroid bed.    PROCEDURE: FLEXIBLE FIBEROPTIC LARYNGOSCOPY (25946)    Due to inability for adequate examination of the larynx and need for magnification to perform the examination, endoscopy was performed.  Risks and benefits were discussed with patient/family and they have given verbal consent to proceed.    Procedure Detail & Findings:     After placement of topical anesthetic intranasally the flexible laryngoscope was inserted into the nare and driven through the nasal cavity with no significant abnormal findings noted in the nasal cavities or nasopharynx. The oropharynx, hypopharynx and larynx were subsequently examined and the following findings were noted:    Base of Tongue: Normal    Valeculla: Normal    Arytenoids: Normal    Introitus of the esophagus: Normal    Epiglottis: Normal    False vocal cords: Normal    True vocal cords: Normal    Subglottic  space: Normal    Mobility of True vocal cords: Normal    Condition: Stable    Complications: Patient tolerated the procedure well with no immediate complication.      Results:     Laboratory Data:  Lab Results   Component Value Date    T4F 1.7 08/10/2019    TSH 0.015 (L) 08/10/2019         Imaging:  No results found.      Impression:       ICD-10-CM    1. Neck nodule  R22.1 US HEAD/NECK (CPT=76536)      2. Thyroid cancer (HCC)  C73       3. Cervicalgia  M54.2           Recommendations:  I would like to evaluate this area with an ultrasound of the neck.  If there are any concerning characteristics we may obtain needle biopsy of the area.  I have a very low suspicion that this is related to her history of thyroid cancer as she has been cancer free for over 7 years.  Nonetheless we will ensure that this is not any sort of recurrence.    Thank you for allowing me to participate in the care of your patient.    Mc Seymour,    Otolaryngology/Rhinology, Sinus, and Endoscopic Skull Base Surgery  [address] [place] [id number]  Phone [phone]  Fax [phone]  7/30/2024  5:00 PM  7/30/2024

## 2024-09-06 ENCOUNTER — HOSPITAL ENCOUNTER (OUTPATIENT)
Dept: ULTRASOUND IMAGING | Facility: HOSPITAL | Age: 64
Discharge: HOME | End: 2024-09-06
Attending: STUDENT IN AN ORGANIZED HEALTH CARE EDUCATION/TRAINING PROGRAM
Payer: COMMERCIAL

## 2024-09-06 DIAGNOSIS — R22.1 NECK NODULE: ICD-10-CM

## 2024-09-06 PROCEDURE — 76536 US EXAM OF HEAD AND NECK: CPT | Performed by: STUDENT IN AN ORGANIZED HEALTH CARE EDUCATION/TRAINING PROGRAM

## 2024-10-03 ENCOUNTER — HOSPITAL ENCOUNTER (OUTPATIENT)
Dept: CT IMAGING | Facility: HOSPITAL | Age: 64
Discharge: HOME | End: 2024-10-03
Attending: STUDENT IN AN ORGANIZED HEALTH CARE EDUCATION/TRAINING PROGRAM
Payer: COMMERCIAL

## 2024-10-03 DIAGNOSIS — E04.1 THYROID NODULE: ICD-10-CM

## 2024-10-09 ENCOUNTER — PATIENT MESSAGE (OUTPATIENT)
Facility: LOCATION | Age: 64
End: 2024-10-09

## 2024-10-09 DIAGNOSIS — E04.1 THYROID NODULE: ICD-10-CM

## 2024-10-09 RX ORDER — PREDNISONE 50 MG/1
TABLET ORAL
Qty: 3 TABLET | Refills: 0 | Status: SHIPPED | OUTPATIENT
Start: 2024-10-09

## 2024-10-09 NOTE — TELEPHONE ENCOUNTER
This refill request is being sent to the provider for the following reason:  []Patient has not had an appointment within the past 12 months but has made an appointment on: ___  [x]Medication is not within protocol - Prednisone and Diphenhydramine  []Patient did not complete follow up recommendations  []Other: ___    Last office visit was:  7/30/24

## 2024-10-22 ENCOUNTER — TELEPHONE (OUTPATIENT)
Dept: OTOLARYNGOLOGY | Facility: CLINIC | Age: 64
End: 2024-10-22

## 2024-10-22 NOTE — TELEPHONE ENCOUNTER
Hypertension     Conversation: Care Coordination    Current Issues/Problems reviewed on call: Initial outreach completed. Instruct on the use of the Hypertension Action Plan, including signs and symptoms of worsening Hypertension (symptoms of a potential exacerbation) and the recommended actions to take, including the importance of reporting symptoms early.   Details for Interventions/Education completed on call: Patient has agreed to participate in the Condition Management Program. Evaluated frequency of blood pressure monitoring and adherence to prescribed medication regime. Reviewed the importance of keeping regularly scheduled medical appointments.    COVID-19 screening completed using the [facility] Symptom . Screening is Negative         Mailings sent:    Welcome Letter, HTN Action Plan and Access to care         Education provided:  Monitoring Blood Pressure, Complications Associated with HTN, Medication Adherence and HTN, Recognition of Warning Signs HTN and Healthy Diet with HTN    Additional education provided (i.e., AHSAN)    CM encourage AHSAN.  Patient declined AHSAN, stating she has no computer and not sure about smart phone.  CM educated patient regarding PharmD participation in HTN RPM.  Patient verbalized understanding of PharmD.        Time frame for follow-up is 7-14 days as able    Plan for next call: Continue educating patient regarding Hypertension and Hypertension healthy lifestyle, monitor for understanding of instruction previously provided, the integration of recommended behaviors, and evaluate patient's commitment to report any Hypertension symptoms early to their clinician.    The Hypertension Prescriptive Care Plan has been reviewed with patient; patient has agreed to participate and follow plan.    F/u HTN Action Plan, checking Blood pressure     Patient daughter calling has questions regarding ct scan order and medication questions.Please advise    Daughter phone number [phone]

## 2024-10-23 NOTE — TELEPHONE ENCOUNTER
Contacted daughter, Janna( verified VR) and discussed how the pre-medications, Prednisone and diphenhydramine should be taken prior to the CT soft tissue of neck due to allergy. Daughter will schedule also the NM thyroid I-131 whole body scan, mentioning when the CT scan is scheduled, and also have mother have lab drawn.   Once these tests are done, will have mother schedule an appt. With Dr. Garcia. For next steps. Daughter verbalized understanding, will call office if any further questions.    GRABIEL Carter

## 2024-11-01 DIAGNOSIS — E04.1 THYROID NODULE: ICD-10-CM

## 2024-11-04 RX ORDER — PREDNISONE 50 MG/1
TABLET ORAL
Qty: 3 TABLET | Refills: 0 | Status: SHIPPED | OUTPATIENT
Start: 2024-11-04

## 2024-11-04 NOTE — TELEPHONE ENCOUNTER
This refill request is being sent to the provider for the following reason:  []Patient has not had an appointment within the past 12 months but has made an appointment on: ___  [x]Medication is not within protocol - Prednisone and Diphenhydramine   []Patient did not complete follow up recommendations  []Other: ___    Last office visit was:  7/30/24 - were these only for the CT soft tissue scan she had done?

## 2024-11-13 ENCOUNTER — PATIENT MESSAGE (OUTPATIENT)
Facility: LOCATION | Age: 64
End: 2024-11-13

## 2024-11-14 NOTE — TELEPHONE ENCOUNTER
Wayne Villatoro, it depends on the type of scanner they use. Any way you can reach out to radiology to see if they needed her to stop? If they do it usually needs to be stopped for 4-6 weeks

## 2024-11-14 NOTE — TELEPHONE ENCOUNTER
Dr. Seymour,  does Dana have to stop taking her Levothyroxine prior to her CT and Thyroid whole body scan?

## 2024-11-19 ENCOUNTER — HOSPITAL ENCOUNTER (OUTPATIENT)
Dept: CT IMAGING | Facility: HOSPITAL | Age: 64
Discharge: HOME | End: 2024-11-19
Attending: STUDENT IN AN ORGANIZED HEALTH CARE EDUCATION/TRAINING PROGRAM
Payer: COMMERCIAL

## 2024-11-19 ENCOUNTER — TELEPHONE (OUTPATIENT)
Dept: OTOLARYNGOLOGY | Facility: CLINIC | Age: 64
End: 2024-11-19

## 2024-11-19 LAB
CREAT BLD-MCNC: 0.7 MG/DL
EGFRCR SERPLBLD CKD-EPI 2021: 97 ML/MIN/1.73M2 (ref 60–?)

## 2024-11-19 PROCEDURE — 82565 ASSAY OF CREATININE: CPT

## 2024-11-19 PROCEDURE — 70491 CT SOFT TISSUE NECK W/DYE: CPT | Performed by: STUDENT IN AN ORGANIZED HEALTH CARE EDUCATION/TRAINING PROGRAM

## 2024-11-19 NOTE — IMAGING NOTE
Break though allergic reaction from contrast administration. Patient was premedicated, and still had hives and itchiness post contrast injection. Radiology nurse monitored patient and discharged.

## 2024-11-19 NOTE — TELEPHONE ENCOUNTER
Per daughter patient had breakthrough reaction through the IV contrast while having CT, patient was still able to complete CT. Per daughter asking if she should schedule other tests ordered. Please advise thank you.

## 2024-11-19 NOTE — IMAGING NOTE
0927 Met pt in CT Holding area after being called to evaluate pt by CT tech REGINA Melendez for allergic reaction to IV CT contrast, CT tech in room, gave pt bottle of water, brought pt's daughter to room. Pt has pruritic hives on upper neck, both sides of face near ears, abdomen, sole of left foot; non-raised, stinging non-pruritic redness upper back x2, non-raised redness over soles of both feet.    0930 /73, HR 93, O2 sat 96%. Pt denies GI symptoms, respirations easy, no respiratory symptoms. Color normal, skin dry & warm. Pt confirms took her 3 doses Prednisone 50 mg at 7 pm 11/18, 2 am & 8 am today, 50 mg Benadryl at 8 am.     0943 Redness on back has resolved, no other changes. Pt feels well. Pt will be with her daughter today. Instructed them to call PCP of breakthrough allergic reaction; in future may need longer period of steroid treatment pre-CT IV contrast studies.Recommended additional dose of Benadryl, can get it from [facility] pharmacy, or enroute home. They agree. I gave pt an additional bottle of water, recommended hydrate well today.        0952 Discharged pt,she feels well.

## 2024-11-19 NOTE — TELEPHONE ENCOUNTER
Dr. Seymour, patient did get the CT done but started to have reactions from the contrast.  The daughter wants to know if she should still get the Whole Body Scan done that you ordered?  Does that require contrast as well? Please advise.

## 2024-11-25 ENCOUNTER — LAB ENCOUNTER (OUTPATIENT)
Dept: LAB | Facility: HOSPITAL | Age: 64
End: 2024-11-25
Attending: STUDENT IN AN ORGANIZED HEALTH CARE EDUCATION/TRAINING PROGRAM
Payer: COMMERCIAL

## 2024-11-25 ENCOUNTER — HOSPITAL ENCOUNTER (OUTPATIENT)
Dept: NUCLEAR MEDICINE | Facility: HOSPITAL | Age: 64
Discharge: HOME | End: 2024-11-25
Attending: STUDENT IN AN ORGANIZED HEALTH CARE EDUCATION/TRAINING PROGRAM
Payer: COMMERCIAL

## 2024-11-25 DIAGNOSIS — E04.1 THYROID NODULE: ICD-10-CM

## 2024-11-25 DIAGNOSIS — C73 PAPILLARY THYROID CARCINOMA (HCC): ICD-10-CM

## 2024-11-25 LAB — GLUCOSE BLDC GLUCOMTR-MCNC: 114 MG/DL (ref 70–99)

## 2024-11-25 PROCEDURE — 82962 GLUCOSE BLOOD TEST: CPT

## 2024-11-25 PROCEDURE — 78815 PET IMAGE W/CT SKULL-THIGH: CPT | Performed by: STUDENT IN AN ORGANIZED HEALTH CARE EDUCATION/TRAINING PROGRAM

## 2024-11-25 PROCEDURE — 36415 COLL VENOUS BLD VENIPUNCTURE: CPT

## 2024-11-25 PROCEDURE — 86800 THYROGLOBULIN ANTIBODY: CPT

## 2024-11-26 ENCOUNTER — PATIENT MESSAGE (OUTPATIENT)
Facility: LOCATION | Age: 64
End: 2024-11-26

## 2024-11-26 DIAGNOSIS — C73 THYROID CANCER (HCC): Primary | ICD-10-CM

## 2024-11-27 NOTE — TELEPHONE ENCOUNTER
Dr. Seymour,  I saw that you would refer patient to Dr. Figueredo for oncology, please double check the referral I pended for patient.   (4) rarely moist

## 2024-11-27 NOTE — TELEPHONE ENCOUNTER
Thank you Shauna, Dr. Figueredo would actually like her to see Dr. Johns, I switched it on the referral.

## 2024-12-03 ENCOUNTER — OFFICE VISIT (OUTPATIENT)
Dept: HEMATOLOGY/ONCOLOGY | Facility: HOSPITAL | Age: 64
End: 2024-12-03
Attending: INTERNAL MEDICINE
Payer: COMMERCIAL

## 2024-12-03 VITALS
BODY MASS INDEX: 22.42 KG/M2 | HEIGHT: 60 IN | HEART RATE: 76 BPM | RESPIRATION RATE: 18 BRPM | SYSTOLIC BLOOD PRESSURE: 112 MMHG | OXYGEN SATURATION: 99 % | DIASTOLIC BLOOD PRESSURE: 95 MMHG | TEMPERATURE: 98 F | WEIGHT: 114.19 LBS

## 2024-12-03 DIAGNOSIS — C77.0 MALIGNANT NEOPLASM METASTATIC TO LYMPH NODE OF NECK (HCC): ICD-10-CM

## 2024-12-03 DIAGNOSIS — R59.0 MEDIASTINAL ADENOPATHY: ICD-10-CM

## 2024-12-03 DIAGNOSIS — C73 RECURRENT THYROID CANCER (HCC): Primary | ICD-10-CM

## 2024-12-03 LAB
ALBUMIN SERPL-MCNC: 4.8 G/DL (ref 3.2–4.8)
ALBUMIN/GLOB SERPL: 1.7 {RATIO} (ref 1–2)
ALP LIVER SERPL-CCNC: 129 U/L
ALT SERPL-CCNC: 37 U/L
ANION GAP SERPL CALC-SCNC: 8 MMOL/L (ref 0–18)
AST SERPL-CCNC: 32 U/L (ref ?–34)
BASOPHILS # BLD AUTO: 0.05 X10(3) UL (ref 0–0.2)
BASOPHILS NFR BLD AUTO: 0.5 %
BILIRUB SERPL-MCNC: 0.4 MG/DL (ref 0.2–1.1)
BUN BLD-MCNC: 12 MG/DL (ref 9–23)
BUN/CREAT SERPL: 11.5 (ref 10–20)
CALCIUM BLD-MCNC: 9.8 MG/DL (ref 8.7–10.4)
CHLORIDE SERPL-SCNC: 108 MMOL/L (ref 98–112)
CO2 SERPL-SCNC: 28 MMOL/L (ref 21–32)
CREAT BLD-MCNC: 1.04 MG/DL
DEPRECATED RDW RBC AUTO: 40.8 FL (ref 35.1–46.3)
EGFRCR SERPLBLD CKD-EPI 2021: 60 ML/MIN/1.73M2 (ref 60–?)
EOSINOPHIL # BLD AUTO: 0.1 X10(3) UL (ref 0–0.7)
EOSINOPHIL NFR BLD AUTO: 1.1 %
ERYTHROCYTE [DISTWIDTH] IN BLOOD BY AUTOMATED COUNT: 13.2 % (ref 11–15)
GLOBULIN PLAS-MCNC: 2.8 G/DL (ref 2–3.5)
GLUCOSE BLD-MCNC: 117 MG/DL (ref 70–99)
HCT VFR BLD AUTO: 39.2 %
HGB BLD-MCNC: 13.4 G/DL
IMM GRANULOCYTES # BLD AUTO: 0.03 X10(3) UL (ref 0–1)
IMM GRANULOCYTES NFR BLD: 0.3 %
LYMPHOCYTES # BLD AUTO: 4.01 X10(3) UL (ref 1–4)
LYMPHOCYTES NFR BLD AUTO: 43.1 %
MCH RBC QN AUTO: 29.3 PG (ref 26–34)
MCHC RBC AUTO-ENTMCNC: 34.2 G/DL (ref 31–37)
MCV RBC AUTO: 85.8 FL
MONOCYTES # BLD AUTO: 0.55 X10(3) UL (ref 0.1–1)
MONOCYTES NFR BLD AUTO: 5.9 %
NEUTROPHILS # BLD AUTO: 4.56 X10 (3) UL (ref 1.5–7.7)
NEUTROPHILS # BLD AUTO: 4.56 X10(3) UL (ref 1.5–7.7)
NEUTROPHILS NFR BLD AUTO: 49.1 %
OSMOLALITY SERPL CALC.SUM OF ELEC: 299 MOSM/KG (ref 275–295)
PLATELET # BLD AUTO: 241 10(3)UL (ref 150–450)
POTASSIUM SERPL-SCNC: 3.6 MMOL/L (ref 3.5–5.1)
PROT SERPL-MCNC: 7.6 G/DL (ref 5.7–8.2)
RBC # BLD AUTO: 4.57 X10(6)UL
SODIUM SERPL-SCNC: 144 MMOL/L (ref 136–145)
T4 FREE SERPL-MCNC: 1.2 NG/DL (ref 0.8–1.7)
TSI SER-ACNC: 10.21 UIU/ML (ref 0.55–4.78)
WBC # BLD AUTO: 9.3 X10(3) UL (ref 4–11)

## 2024-12-03 PROCEDURE — 99205 OFFICE O/P NEW HI 60 MIN: CPT | Performed by: INTERNAL MEDICINE

## 2024-12-03 NOTE — CONSULTS
[facility] Consultation Note    Patient Name: Dana Carter   YOB: 1960   Medical Record Number: [id number]   CSN: [id number]   Consulting Physician: Luis Johns MD  Referring Physician(s): Dr Mc Seymour MD  Date of Consultation: 12/3/2024     Reason for Consultation:  Recurrent thyroid cancer    The patient speaks Setswana and her daughter serves as  [they refused professional ]    History of Present Illness:   Dana Carter is a 64 year old female that was seen today in the Cancer Center for recurrent thyroid ca. Her oncologic history is detailed below    2015 She was initially diagnosed with thyroid cancer in 2015 and underwent total thyroidectomy with Dr. Garcia. Her final pathology demonstrated stage T3 papillary thyroid cancer, largest location 3.8cm.  The carcinoma focally involves the left and isthmus inked anterior resection margin.  3 benign lymph nodes were noted.  No evidence of lymphovascular invasion.  Final pathologic stage T3 N0     Her initial surgery was followed by I-131 therapy and received 104 miCu LINARES for treatment.     She was subsequently maintained on levothyroxine with suppressed TSH.     2/2017 radioiodine uptake study showed persistent increased uptake of radioiodine in the lung apex.  There was rising thyroglobulin antibody levels as well.  She therefore underwent a second course of radioiodine therapy with a dose of 150 mCi I-131.     She was continued on levothyroxine.  PET/CT in 2019 had shown persistent focal uptake in the right lung apex as well as rising thyroglobulin activity.  However PET/CT fusion study showed no definite evidence of active thyroid cancer.  There were some hypermetabolic left hilar and AP window lymph nodes that [facility] given the extensive granulomatous disease seen in the lung fields bilaterally.    She last saw ENT in 2019 but has been taking her levothyroxine regularly with her  PCP.  However I do not see any recent thyroglobulin antibody tests.  She presented to ENT in July with a palpable nodule in the right neck.  Ultrasound on 2024 showed a new 1.6 cm ovoid hypoechoic solid mass in the anterior neck thought to represent an abnormal enlarged lymph node.  Was recommended a radioiodine uptake study which she has not yet had done.    2024 the nodule enlarged and she underwent a CT scan that showed s/p thyroidectomy along with a 1.6 cm enhancing nodule in the right anterior neck that was worrisome for papillary thyroid cancer recurrence.  There was stable extensive right apical scarring.    2024 there were hypermetabolic nodes in the neck soft tissues as well as hypermetabolic mediastinal left perihilar lymph nodes all concerning for metastatic involvement.    The patient denies any new symptoms.  She is otherwise working full-time and denies any recent weight changes or other problems.        Past Medical History:  Past Medical History:    Other and unspecified hyperlipidemia    TB (tuberculosis)    pt was treated for tb in the     Thyroid cancer (HCC)       Past Surgical History:  Past Surgical History:   Procedure Laterality Date          Thyroidectomy  2015       Family Medical History:  Family History   Problem Relation Age of Onset    Thyroid Cancer Self     Diabetes Mother     Diabetes Sister     Diabetes Brother        Gyne History:  OB History    Para Term  AB Living   4 4 0 0 0 0   SAB IAB Ectopic Multiple Live Births   0 0 0 0 0       Social History:  Social History     Socioeconomic History    Marital status:      Spouse name: Not on file    Number of children: Not on file    Years of education: Not on file    Highest education level: Not on file   Occupational History    Not on file   Tobacco Use    Smoking status: Never    Smokeless tobacco: Never   Vaping Use    Vaping status: Never Used   Substance and Sexual Activity     Alcohol use: No    Drug use: No    Sexual activity: Not on file   Other Topics Concern    Not on file   Social History Narrative    Not on file     Social Drivers of Health     Financial Resource Strain: Not on file   Food Insecurity: Not on file   Transportation Needs: Not on file   Physical Activity: Not on file   Stress: Not on file   Social Connections: Not on file   Housing Stability: Not on file       Allergies:   Allergies[1]    Current Medications:   Levothyroxine Sodium (SYNTHROID, LEVOTHROID) 125 MCG Oral Tab Take 112 mcg by mouth before breakfast.   (Patient taking differently: Take 100 mcg by mouth before breakfast.)      Atorvastatin Calcium (LIPITOR) 10 MG Oral Tab Take 1 tablet (10 mg total) by mouth nightly.         Review of Systems:  A comprehensive 14 point review of systems was completed.  Pertinent positives and negatives noted in the the HPI.     Vital Signs:  BP (!) 112/95 (BP Location: Left arm, Patient Position: Sitting, Cuff Size: adult)   Pulse 76   Temp 97.5 °F (36.4 °C) (Oral)   Resp 18   Ht 1.524 m (5')   Wt 51.8 kg (114 lb 3.2 oz)   SpO2 99%   BMI 22.30 kg/m²     Physical Examination:    General: Patient is alert and oriented x 3, not in acute distress.  Psych:  normal mood and affect  HEENT: EOMs intact. PERRL. Oropharynx is clear.   Neck: No JVD. ~2cm nodule in the right neck  Lymphatics: There is no palpable lymphadenopathy throughout in the cervical, supraclavicular or axillary regions.  Chest: Clear to auscultation. No wheezes or rales.  Heart: Regular rate and rhythm. S1S2 normal.  Abdomen: Soft, non tender.  No palpable mass.  Extremities: No edema or calf tenderness.  Neurological: Grossly intact.     Performance Status:  ECOG -0    Labs:    Lab Results   Component Value Date/Time    WBC 9.3 12/03/2024 03:47 PM    RBC 4.57 12/03/2024 03:47 PM    HGB 13.4 12/03/2024 03:47 PM    HCT 39.2 12/03/2024 03:47 PM    MCV 85.8 12/03/2024 03:47 PM    MCH 29.3 12/03/2024 03:47 PM     MCHC 34.2 12/03/2024 03:47 PM    RDW 13.2 12/03/2024 03:47 PM    NEPRELIM 4.56 12/03/2024 03:47 PM    .0 12/03/2024 03:47 PM       Lab Results   Component Value Date/Time     (H) 12/03/2024 03:47 PM    BUN 12 12/03/2024 03:47 PM    CREATSERUM 1.04 (H) 12/03/2024 03:47 PM    GFRNAA >60 10/14/2016 09:10 AM    CA 9.8 12/03/2024 03:47 PM    ALB 4.8 12/03/2024 03:47 PM     12/03/2024 03:47 PM    K 3.6 12/03/2024 03:47 PM     12/03/2024 03:47 PM    CO2 28.0 12/03/2024 03:47 PM    ALKPHO 129 12/03/2024 03:47 PM    AST 32 12/03/2024 03:47 PM    ALT 37 12/03/2024 03:47 PM       Imaging:    Personally reviewed recent PET and CT films with patient/family      Impression:  Diagnosis  1. Recurrent thyroid cancer (HCC)    2. 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 Mediastinal adenopathy      64-year-old female with what appears to be metastatic recurrence of papillary thyroid cancer.  She is status post thyroidectomy as well as radioiodine therapy x 2.  I had a long discussion with the patient and her daughter and explained treatment options including repeat to radioiodine therapy if she does have iodine avid disease versus the need for biopsy and institution of an oral TKI based on mutation testing.    Plan:  Obtain baseline TSH/thyroglobulin studies  Requested radio-iodine uptake study  If this is negative, will need a biopsy to evaluate for NGS and BRAF testing  RTC in 2-3 wks to finalize treatment plan    Emotional Well Being:    Emotional Well Being discussed, patient aware of support systems available through the Cancer Center. No acute issues.     The diagnosis, prognosis, treatment goals, plan for treatment, and expected response was explained to the patient.       Thank you Dr Mc Seymour MD for the opportunity to participate in the care of this interesting patient. Please do contact me if I may be of any further assistance    Luis Johns MD  [facility]  Hematology/Oncology    Total time on this consult was  65 minutes, more than 50% of the time was spent in counseling and/or coordination of care related to recurrent thyroid cancer.    Copy to: Dr SHER COPPOLA MD        [1]   Allergies  Allergen Reactions    Iodinated Diagnostic Agents [Radiology Contrast Iodinated Dyes] HIVES     Patient broke out in hives after contrast iv dye was given

## 2024-12-04 LAB
LC THYROGLOBIN LCMS: 4.3 NG/ML
THYROGLOB AB: 86.4 IU/ML

## 2024-12-16 DIAGNOSIS — C73 THYROID CANCER (HCC): Primary | ICD-10-CM

## 2025-01-20 ENCOUNTER — HOSPITAL ENCOUNTER (OUTPATIENT)
Dept: NUCLEAR MEDICINE | Facility: HOSPITAL | Age: 65
Discharge: HOME | End: 2025-01-20
Attending: INTERNAL MEDICINE
Payer: COMMERCIAL

## 2025-01-20 DIAGNOSIS — C73 RECURRENT THYROID CANCER (HCC): ICD-10-CM

## 2025-01-20 PROCEDURE — 78018 THYROID MET IMAGING BODY: CPT | Performed by: INTERNAL MEDICINE

## 2025-01-20 PROCEDURE — 78020 THYROID MET UPTAKE: CPT | Performed by: INTERNAL MEDICINE

## 2025-01-20 RX ORDER — WATER 10 ML/10ML
INJECTION INTRAMUSCULAR; INTRAVENOUS; SUBCUTANEOUS
Status: DISPENSED
Start: 2025-01-20 | End: 2025-01-20

## 2025-01-21 ENCOUNTER — HOSPITAL ENCOUNTER (OUTPATIENT)
Dept: NUCLEAR MEDICINE | Facility: HOSPITAL | Age: 65
Discharge: HOME | End: 2025-01-21
Attending: INTERNAL MEDICINE
Payer: COMMERCIAL

## 2025-01-22 ENCOUNTER — HOSPITAL ENCOUNTER (OUTPATIENT)
Dept: NUCLEAR MEDICINE | Facility: HOSPITAL | Age: 65
Discharge: HOME | End: 2025-01-22
Attending: INTERNAL MEDICINE
Payer: COMMERCIAL

## 2025-01-24 ENCOUNTER — HOSPITAL ENCOUNTER (OUTPATIENT)
Dept: NUCLEAR MEDICINE | Facility: HOSPITAL | Age: 65
Discharge: HOME | End: 2025-01-24
Attending: INTERNAL MEDICINE
Payer: COMMERCIAL

## 2025-01-24 DIAGNOSIS — C73 THYROID CANCER (HCC): ICD-10-CM

## 2025-01-24 PROCEDURE — 36415 COLL VENOUS BLD VENIPUNCTURE: CPT | Performed by: RADIOLOGY

## 2025-01-24 PROCEDURE — 86800 THYROGLOBULIN ANTIBODY: CPT | Performed by: RADIOLOGY

## 2025-01-27 ENCOUNTER — TELEPHONE (OUTPATIENT)
Age: 65
End: 2025-01-27

## 2025-01-27 NOTE — TELEPHONE ENCOUNTER
Called patients daughter Shruti, Radio-iodine study shows no uptake in the lesions noted on PET, suggest she undergo a biopsy of one of the neck nodes with IR. Discussed with Shruti, that we can cancel the follow up appointment tomorrow if they have no questions and reschedule it after the Biopsy with IR. She thanked me for the call. Appointment cancelled for tomorrow.

## 2025-01-28 ENCOUNTER — APPOINTMENT (OUTPATIENT)
Age: 65
End: 2025-01-28
Attending: INTERNAL MEDICINE
Payer: COMMERCIAL

## 2025-01-30 ENCOUNTER — TELEPHONE (OUTPATIENT)
Age: 65
End: 2025-01-30

## 2025-01-30 NOTE — TELEPHONE ENCOUNTER
Called Daughter back, notified her that she needs a CBC prior to her biopsy and she can go to any [facility] lab a couple days prior to procedure. She thanked me for the call.

## 2025-01-30 NOTE — TELEPHONE ENCOUNTER
Pt daughter Shruti calling to double check if any labs are needed before the Biopsy 2/5.    Please contact Shruti

## 2025-02-01 ENCOUNTER — LAB ENCOUNTER (OUTPATIENT)
Dept: LAB | Facility: HOSPITAL | Age: 65
End: 2025-02-01
Attending: RADIOLOGY
Payer: COMMERCIAL

## 2025-02-01 DIAGNOSIS — Z01.818 PRE-OP TESTING: ICD-10-CM

## 2025-02-01 LAB
BASOPHILS # BLD AUTO: 0.05 X10(3) UL (ref 0–0.2)
BASOPHILS NFR BLD AUTO: 0.8 %
DEPRECATED RDW RBC AUTO: 43.2 FL (ref 35.1–46.3)
EOSINOPHIL # BLD AUTO: 0.09 X10(3) UL (ref 0–0.7)
EOSINOPHIL NFR BLD AUTO: 1.5 %
ERYTHROCYTE [DISTWIDTH] IN BLOOD BY AUTOMATED COUNT: 13.2 % (ref 11–15)
HCT VFR BLD AUTO: 39.6 %
HGB BLD-MCNC: 12.8 G/DL
IMM GRANULOCYTES # BLD AUTO: 0.01 X10(3) UL (ref 0–1)
IMM GRANULOCYTES NFR BLD: 0.2 %
LYMPHOCYTES # BLD AUTO: 2.24 X10(3) UL (ref 1–4)
LYMPHOCYTES NFR BLD AUTO: 37.5 %
MCH RBC QN AUTO: 28.6 PG (ref 26–34)
MCHC RBC AUTO-ENTMCNC: 32.3 G/DL (ref 31–37)
MCV RBC AUTO: 88.6 FL
MONOCYTES # BLD AUTO: 0.32 X10(3) UL (ref 0.1–1)
MONOCYTES NFR BLD AUTO: 5.4 %
NEUTROPHILS # BLD AUTO: 3.26 X10 (3) UL (ref 1.5–7.7)
NEUTROPHILS # BLD AUTO: 3.26 X10(3) UL (ref 1.5–7.7)
NEUTROPHILS NFR BLD AUTO: 54.6 %
PLATELET # BLD AUTO: 229 10(3)UL (ref 150–450)
RBC # BLD AUTO: 4.47 X10(6)UL
WBC # BLD AUTO: 6 X10(3) UL (ref 4–11)

## 2025-02-01 PROCEDURE — 85025 COMPLETE CBC W/AUTO DIFF WBC: CPT

## 2025-02-01 PROCEDURE — 36415 COLL VENOUS BLD VENIPUNCTURE: CPT

## 2025-02-05 ENCOUNTER — HOSPITAL ENCOUNTER (OUTPATIENT)
Dept: INTERVENTIONAL RADIOLOGY/VASCULAR | Facility: HOSPITAL | Age: 65
Discharge: HOME | End: 2025-02-05
Attending: INTERNAL MEDICINE | Admitting: RADIOLOGY
Payer: COMMERCIAL

## 2025-02-05 VITALS
BODY MASS INDEX: 22.38 KG/M2 | HEART RATE: 67 BPM | HEIGHT: 60 IN | WEIGHT: 114 LBS | OXYGEN SATURATION: 97 % | RESPIRATION RATE: 14 BRPM | SYSTOLIC BLOOD PRESSURE: 122 MMHG | TEMPERATURE: 98 F | DIASTOLIC BLOOD PRESSURE: 58 MMHG

## 2025-02-05 DIAGNOSIS — C73 RECURRENT THYROID CANCER (HCC): ICD-10-CM

## 2025-02-05 DIAGNOSIS — Z01.818 PRE-OP TESTING: Primary | ICD-10-CM

## 2025-02-05 DIAGNOSIS — C77.0 MALIGNANT NEOPLASM METASTATIC TO LYMPH NODE OF NECK (HCC): ICD-10-CM

## 2025-02-05 LAB
LC THYROGLOBIN LCMS: 17.8 NG/ML
THYROGLOB AB: 74.3 IU/ML

## 2025-02-05 PROCEDURE — 88342 IMHCHEM/IMCYTCHM 1ST ANTB: CPT | Performed by: INTERNAL MEDICINE

## 2025-02-05 PROCEDURE — 0WB63ZX EXCISION OF NECK, PERCUTANEOUS APPROACH, DIAGNOSTIC: ICD-10-PCS | Performed by: RADIOLOGY

## 2025-02-05 PROCEDURE — 76942 ECHO GUIDE FOR BIOPSY: CPT | Performed by: RADIOLOGY

## 2025-02-05 PROCEDURE — 20206 BIOPSY MUSCLE PERQ NEEDLE: CPT | Performed by: RADIOLOGY

## 2025-02-05 PROCEDURE — 88334 PATH CONSLTJ SURG CYTO XM EA: CPT | Performed by: INTERNAL MEDICINE

## 2025-02-05 PROCEDURE — 88333 PATH CONSLTJ SURG CYTO XM 1: CPT | Performed by: INTERNAL MEDICINE

## 2025-02-05 PROCEDURE — 88341 IMHCHEM/IMCYTCHM EA ADD ANTB: CPT | Performed by: INTERNAL MEDICINE

## 2025-02-05 PROCEDURE — 88305 TISSUE EXAM BY PATHOLOGIST: CPT | Performed by: INTERNAL MEDICINE

## 2025-02-05 RX ORDER — LIDOCAINE HYDROCHLORIDE 20 MG/ML
INJECTION, SOLUTION INFILTRATION; PERINEURAL
Status: COMPLETED
Start: 2025-02-05 | End: 2025-02-05

## 2025-02-05 RX ORDER — LEVOTHYROXINE SODIUM 112 UG/1
112 TABLET ORAL
Status: ON HOLD | COMMUNITY
End: 2025-02-05

## 2025-02-05 NOTE — IVS NOTE
DISCHARGE NOTE      Pt is able to sit up and ambulate without difficulty.   Procedural site dressing remains clean, dry and intact.  No signs and symptoms of bleeding/hematoma noted.   Instruction provided, patient/family verbalizes understanding.   Dr. Holm spoke with patient/family post procedure.     Patient discharge via ambulation with daughters to main [facility]

## 2025-02-05 NOTE — IVS NOTE
Dr. Holm at bedside, timeout performed    Dr. Holm administered lidocaine to right side of neck.   Patients vitals monitored during procedure.    Dr. Holm obtained 5 tissue samples, given to cytology.    Dr. Holm held manual pressure to biopsy site, no hematoma. Site is clean and dry.   bandage covering site.    RN writer, Kirstin George gave gave report to Karolina CORNELL at bedside.

## 2025-02-05 NOTE — H&P
[facility]  part of [facility]   History & Physical    Dana Carter Patient Status:  Outpatient    1960 MRN [id number]   Location [facility] INTERVENTIONAL SUITES Attending Luis Johns MD   Hosp Day # 0 PCP SHER COPPOLA MD     Admitting Diagnosis:   Pet avid lesion in neck, prior thyroidectomy    History of Present Illness:   As above    History   Past Medical History:  Past Medical History:    Other and unspecified hyperlipidemia    TB (tuberculosis)    pt was treated for tb in the     Thyroid cancer (HCC)       Past Surgical History:  Past Surgical History:   Procedure Laterality Date          Thyroidectomy  2015       Social History:  Social History     Tobacco Use    Smoking status: Never    Smokeless tobacco: Never   Substance Use Topics    Alcohol use: No        Family History:  Family History   Problem Relation Age of Onset    Thyroid Cancer Self     Diabetes Mother     Diabetes Sister     Diabetes Brother        Allergies/Medications:   Allergies:  Allergies[1]    Medications:  No current outpatient medications on file.    Physical Exam & Review of Systems:   Physical Exam:    /72 (BP Location: Right arm)   Pulse 62   Temp 98.1 °F (36.7 °C) (Temporal)   Resp 18   Ht 60\"   Wt 114 lb (51.7 kg)   SpO2 98%   BMI 22.26 kg/m²     General: NAD  Neck: No JVD  Lungs: CTA bilat  Heart: RRR, S1, S2       Results:   Labs:  Recent Labs   Lab 25  0721   RBC 4.47   HGB 12.8   HCT 39.6   MCV 88.6   MCH 28.6   MCHC 32.3   RDW 13.2   NEPRELIM 3.26   WBC 6.0   .0     No results for input(s): \"PTP\", \"INR\", \"PTT\" in the last 168 hours.  No results for input(s): \"GLU\", \"BUN\", \"CREATSERUM\", \"GFRAA\", \"GFRNAA\", \"CA\", \"NA\", \"K\", \"CL\", \"CO2\" in the last 168 hours.    Assessment/Plan:   Impression: pet avid lesions in neck    Recommendations: appropriate candidate for bx    Luis Holm MD  2025  9:41 AM           [1]   Allergies  Allergen  Reactions    Iodinated Diagnostic Agents [Radiology Contrast Iodinated Dyes] HIVES     Patient broke out in hives after contrast iv dye was given

## 2025-02-05 NOTE — PROCEDURES
[facility]  part of [facility]  Procedure Note    Dana Carter Patient Status:  Outpatient    1960 MRN [id number]   Location [facility] INTERVENTIONAL SUITES Attending Luis Johns MD   Hosp Day # 0 PCP SHER COPPOLA MD     Procedure: sonographic guided right cervical lymph node/mass biopsy    Pre-Procedure Diagnosis:  r/o mets    Post-Procedure Diagnosis: same    Anesthesia:  Local    Findings:  heterogenous lesion in thyroid bed    Specimens: 5 18 gauge core samples    Blood Loss:  minimal       Complications:  None       Luis Holm MD  2025

## 2025-02-05 NOTE — DISCHARGE INSTRUCTIONS
INTERVENTIONAL RADIOLOGY  [facility]  [phone]     Patient Name:  Dana Carter    Procedure:  Right Cervical Lymph Node Biopsy    Site Care: Remove your band-aid or dressing in 24 hours.  Gently wash area with soap and water.                                       Activity/Diet  No heavy lifting or strenuous activity for 48 hours.  Drink plenty of fluids, unless you have otherwise been told to restrict your fluid intake.  Do not drink alcohol for 24 hours.  Do not drive,  operate heavy machinery, make important decisions or sign legal documents today.    Medications:  Make no changes to your existing medications.    Contact Interventional Radiology at [phone] if you have severe/unrelieved pain, fever, chills, dizziness/lightheadedness, or drainage/bleeding from your incision site.

## 2025-02-07 ENCOUNTER — DOCUMENTATION ONLY (OUTPATIENT)
Age: 65
End: 2025-02-07

## 2025-02-07 NOTE — PROGRESS NOTES
Order entered online for [employer] for PD-L1, MMR will be drawn from Pathology specimen [id number] .

## 2025-02-11 ENCOUNTER — OFFICE VISIT (OUTPATIENT)
Age: 65
End: 2025-02-11
Attending: INTERNAL MEDICINE
Payer: COMMERCIAL

## 2025-02-11 VITALS
SYSTOLIC BLOOD PRESSURE: 159 MMHG | DIASTOLIC BLOOD PRESSURE: 81 MMHG | TEMPERATURE: 99 F | OXYGEN SATURATION: 97 % | WEIGHT: 110.63 LBS | HEIGHT: 60 IN | HEART RATE: 74 BPM | BODY MASS INDEX: 21.72 KG/M2 | RESPIRATION RATE: 16 BRPM

## 2025-02-11 DIAGNOSIS — C73 RECURRENT THYROID CANCER (HCC): Primary | ICD-10-CM

## 2025-02-11 DIAGNOSIS — C77.0 MALIGNANT NEOPLASM METASTATIC TO LYMPH NODE OF NECK (HCC): ICD-10-CM

## 2025-02-11 DIAGNOSIS — R59.0 MEDIASTINAL ADENOPATHY: ICD-10-CM

## 2025-02-11 NOTE — PROGRESS NOTES
[facility] Hematology Oncology Group Office Note    Patient Name: Dana Carter   YOB: 1960   Medical Record Number: [id number]   CSN: [id number]     The patient speaks Swedish and her daughter serves as  [they refused professional ]    Diagnosis  1. Recurrent thyroid cancer (HCC)    2. 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 Mediastinal adenopathy        INTERVAL HISTORY  Patient returns for follow-up. Since the last visit she has undergone the following workup    1/24/25 she underwent a radioiodine uptake study that showed the PET positive lesions are not iodine avid.  Mild uptake in the superior right hemithorax was seen which was unchanged from 2019 2/5/2025 the patient underwent right neck mass from the prior thyroidectomy side that showed papillary thyroid carcinoma. NGS/MSI testing is pending    She has some soreness postbiopsy but denies any significant change in the small palpable nodule in the right neck.  Otherwise generally feels well.  She is still working full-time    History of Present Illness:   Dana Carter is a 64 year old female that was [facility] in [facility] for recurrent thyroid ca. Her oncologic history is detailed below    2015 She was initially diagnosed with thyroid cancer in 2015 and underwent total thyroidectomy with Dr. Garcia. Her final pathology demonstrated stage T3 papillary thyroid cancer, largest location 3.8cm.  The carcinoma focally involves the left and isthmus inked anterior resection margin.  3 benign lymph nodes were noted.  No evidence of lymphovascular invasion.  Final pathologic stage T3 N0     Her initial surgery was followed by I-131 therapy and received 104 miCu LINARES for treatment.     She was subsequently maintained on levothyroxine with suppressed TSH.     2/2017 radioiodine uptake study showed persistent increased uptake of radioiodine in the lung apex.  There was  rising thyroglobulin antibody levels as well.  She therefore underwent a second course of radioiodine therapy with a dose of 150 mCi I-131.     She was continued on levothyroxine.  PET/CT in 2019 had shown persistent focal uptake in the right lung apex as well as rising thyroglobulin activity.  However PET/CT fusion study showed no definite evidence of active thyroid cancer.  There were some hypermetabolic left hilar and AP window lymph nodes that [facility] given the extensive granulomatous disease seen in the lung fields bilaterally.    She last saw ENT in  but has been taking her levothyroxine regularly with her PCP.  However I do not see any recent thyroglobulin antibody tests.  She presented to ENT in July with a palpable nodule in the right neck.  Ultrasound on 2024 showed a new 1.6 cm ovoid hypoechoic solid mass in the anterior neck thought to represent an abnormal enlarged lymph node.  Was recommended a radioiodine uptake study which she has not yet had done.    2024 the nodule enlarged and she underwent a CT scan that showed s/p thyroidectomy along with a 1.6 cm enhancing nodule in the right anterior neck that was worrisome for papillary thyroid cancer recurrence.  There was stable extensive right apical scarring.    2024 PET showed hypermetabolic nodes in the neck soft tissues as well as hypermetabolic mediastinal left perihilar lymph nodes all concerning for metastatic involvement.    The patient denies any new symptoms.  She is otherwise working full-time and denies any recent weight changes or other problems.        Past Medical History:  Past Medical History:    Other and unspecified hyperlipidemia    TB (tuberculosis)    pt was treated for tb in the     Thyroid cancer (HCC)       Past Surgical History:  Past Surgical History:   Procedure Laterality Date          Thyroidectomy  2015       Family Medical History:  Family History   Problem Relation Age of Onset    Thyroid  Cancer Self     Diabetes Mother     Diabetes Sister     Diabetes Brother        Gyne History:  OB History    Para Term  AB Living   4 4 0 0 0 0   SAB IAB Ectopic Multiple Live Births   0 0 0 0 0       Social History:  Social History     Socioeconomic History    Marital status:      Spouse name: Not on file    Number of children: Not on file    Years of education: Not on file    Highest education level: Not on file   Occupational History    Not on file   Tobacco Use    Smoking status: Never    Smokeless tobacco: Never   Vaping Use    Vaping status: Never Used   Substance and Sexual Activity    Alcohol use: No    Drug use: No    Sexual activity: Not on file   Other Topics Concern    Not on file   Social History Narrative    Not on file     Social Drivers of Health     Food Insecurity: Not on file   Transportation Needs: Not on file   Housing Stability: Not on file       Allergies:   Allergies[1]    Current Medications:  No outpatient medications have been marked as taking for the 25 encounter (Office Visit) with Luis Johns MD.       Review of Systems:  A comprehensive 14 point review of systems was completed.  Pertinent positives and negatives noted in the the HPI.     Vital Signs:  /81 (BP Location: Left arm, Patient Position: Sitting, Cuff Size: adult)   Pulse 74   Temp 98.7 °F (37.1 °C) (Oral)   Resp 16   Ht 1.524 m (5')   Wt 50.2 kg (110 lb 9.6 oz)   SpO2 97%   BMI 21.60 kg/m²     Physical Examination:    General: Patient is alert and oriented x 3, not in acute distress.  Psych:  normal mood and affect  HEENT: EOMs intact. PERRL. Oropharynx is clear.   Neck: No JVD. ~1.5cm nodule in the right neck - bruising from recent bx  Lymphatics: There is no palpable lymphadenopathy throughout in the cervical, supraclavicular or axillary regions.  Chest: Clear to auscultation. No wheezes or rales.  Heart: Regular rate and rhythm. S1S2 normal.  Extremities: No edema or calf  tenderness.  Neurological: Grossly intact.     Performance Status:  ECOG -0    Labs:    Lab Results   Component Value Date/Time    WBC 6.0 02/01/2025 07:21 AM    RBC 4.47 02/01/2025 07:21 AM    HGB 12.8 02/01/2025 07:21 AM    HCT 39.6 02/01/2025 07:21 AM    MCV 88.6 02/01/2025 07:21 AM    MCH 28.6 02/01/2025 07:21 AM    MCHC 32.3 02/01/2025 07:21 AM    RDW 13.2 02/01/2025 07:21 AM    NEPRELIM 3.26 02/01/2025 07:21 AM    .0 02/01/2025 07:21 AM       Lab Results   Component Value Date/Time     (H) 12/03/2024 03:47 PM    BUN 12 12/03/2024 03:47 PM    CREATSERUM 1.04 (H) 12/03/2024 03:47 PM    GFRNAA >60 10/14/2016 09:10 AM    CA 9.8 12/03/2024 03:47 PM    ALB 4.8 12/03/2024 03:47 PM     12/03/2024 03:47 PM    K 3.6 12/03/2024 03:47 PM     12/03/2024 03:47 PM    CO2 28.0 12/03/2024 03:47 PM    ALKPHO 129 12/03/2024 03:47 PM    AST 32 12/03/2024 03:47 PM    ALT 37 12/03/2024 03:47 PM       Imaging:    Personally reviewed recent PET and CT films with patient/family    Impression:  Diagnosis  1. Recurrent thyroid cancer (HCC)    2. 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 Mediastinal adenopathy      64-year-old female with metastatic recurrence of papillary thyroid cancer in the right neck and left luis-hilar/mediastinal lymph nodes.  She is status post thyroidectomy as well as radioiodine therapy x 2.  Her disease is no longer radioiodine avid.    I had a long discussion with the patient and her accompanying caregiver and explained that she has disease that is no longer curable.  However this is very treatable with oral TKI therapy.  She has asymptomatic small volume disease that is relatively slow-growing, so careful monitoring with serial scans is also a reasonable option especially given the anticipated side effects of TKI therapy with lenvatinib etc.    Plan:  The patient prefers continued monitoring with serial scans rather than initiation of TKI therapy.  Will plan CT  neck and chest in 2 months as well as continued thyroglobulin testing  Continue Synthroid suppression.  Will await NGS testing to evaluate for BRAF or other mutations  She will return for follow-up in 2 months.  We discussed symptoms to watch for including rapidly enlarging neck nodules or dyspnea or other symptoms in which case she may need to initiate treatment sooner    Emotional Well Being:    Emotional Well Being discussed, patient aware of support systems available through the Cancer Center. No acute issues.     The diagnosis, prognosis, treatment goals, plan for treatment, and expected response was explained to the patient.       Thank you Dr Mc Seymour MD for the opportunity to participate in the care of this interesting patient. Please do contact me if I may be of any further assistance    Luis Johns MD  [facility] Hematology/Oncology    Copy to: Dr SHER COPPOLA MD     High complexity MDM. Our clinic is continuing focal point for all oncologic services the patient needs.         [1]   Allergies  Allergen Reactions    Iodinated Diagnostic Agents [Radiology Contrast Iodinated Dyes] HIVES     Patient broke out in hives after contrast iv dye was given

## 2025-04-12 ENCOUNTER — HOSPITAL ENCOUNTER (OUTPATIENT)
Dept: CT IMAGING | Facility: HOSPITAL | Age: 65
Discharge: HOME | End: 2025-04-12
Attending: INTERNAL MEDICINE
Payer: COMMERCIAL

## 2025-04-12 ENCOUNTER — LAB ENCOUNTER (OUTPATIENT)
Dept: LAB | Facility: HOSPITAL | Age: 65
End: 2025-04-12
Attending: INTERNAL MEDICINE
Payer: COMMERCIAL

## 2025-04-12 DIAGNOSIS — R59.0 MEDIASTINAL ADENOPATHY: ICD-10-CM

## 2025-04-12 DIAGNOSIS — C77.0 MALIGNANT NEOPLASM METASTATIC TO LYMPH NODE OF NECK (HCC): ICD-10-CM

## 2025-04-12 DIAGNOSIS — C73 RECURRENT THYROID CANCER (HCC): ICD-10-CM

## 2025-04-12 LAB
T3FREE SERPL-MCNC: 4.11 PG/ML (ref 2.4–4.2)
T4 FREE SERPL-MCNC: 1.7 NG/DL (ref 0.8–1.7)
TSI SER-ACNC: 0.04 UIU/ML (ref 0.55–4.78)

## 2025-04-12 PROCEDURE — 84481 FREE ASSAY (FT-3): CPT

## 2025-04-12 PROCEDURE — 84439 ASSAY OF FREE THYROXINE: CPT

## 2025-04-12 PROCEDURE — 86800 THYROGLOBULIN ANTIBODY: CPT

## 2025-04-12 PROCEDURE — 84443 ASSAY THYROID STIM HORMONE: CPT

## 2025-04-12 PROCEDURE — 36415 COLL VENOUS BLD VENIPUNCTURE: CPT

## 2025-04-12 PROCEDURE — 70490 CT SOFT TISSUE NECK W/O DYE: CPT | Performed by: INTERNAL MEDICINE

## 2025-04-12 PROCEDURE — 71250 CT THORAX DX C-: CPT | Performed by: INTERNAL MEDICINE

## 2025-04-16 ENCOUNTER — OFFICE VISIT (OUTPATIENT)
Age: 65
End: 2025-04-16
Attending: INTERNAL MEDICINE
Payer: COMMERCIAL

## 2025-04-16 VITALS
HEIGHT: 60 IN | OXYGEN SATURATION: 97 % | TEMPERATURE: 98 F | RESPIRATION RATE: 18 BRPM | HEART RATE: 74 BPM | BODY MASS INDEX: 21.28 KG/M2 | SYSTOLIC BLOOD PRESSURE: 145 MMHG | WEIGHT: 108.38 LBS | DIASTOLIC BLOOD PRESSURE: 72 MMHG

## 2025-04-16 DIAGNOSIS — C73 RECURRENT THYROID CANCER (HCC): Primary | ICD-10-CM

## 2025-04-16 DIAGNOSIS — C77.0 MALIGNANT NEOPLASM METASTATIC TO LYMPH NODE OF NECK (HCC): ICD-10-CM

## 2025-04-16 DIAGNOSIS — R59.0 MEDIASTINAL ADENOPATHY: ICD-10-CM

## 2025-04-16 NOTE — PROGRESS NOTES
[facility]ematology Oncology Group Office Note    Patient Name: Dana Carter   YOB: 1960   Medical Record Number: [id number]   CSN: [id number]     The patient speaks [place]er and her daughter serves as  [they refused professional ]    Diagnosis  1. Recurrent thyroid cancer (HCC)    2. 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 Mediastinal adenopathy        INTERVAL HISTORY  Patient returns for follow-up. Since the last visit she has done well.  She denies any significant change in the nodules in the right neck but does have some occasional discomfort that is postural.  No recent weight changes or new bone pain.  She had a recent CT scan and is here to discuss those results    Past Oncologic History   Dana Carter is a 64 year old female that was seen today in the Cancer Center for recurrent thyroid ca. Her oncologic history is detailed below    2015 She was initially diagnosed with thyroid cancer in 2015 and underwent total thyroidectomy with Dr. Garcia. Her final pathology demonstrated stage T3 papillary thyroid cancer, largest location 3.8cm.  The carcinoma focally involves the left and isthmus inked anterior resection margin.  3 benign lymph nodes were noted.  No evidence of lymphovascular invasion.  Final pathologic stage T3 N0     Her initial surgery was followed by I-131 therapy and received 104 miCu LINARES for treatment.     She was subsequently maintained on levothyroxine with suppressed TSH.     2/2017 radioiodine uptake study showed persistent increased uptake of radioiodine in the lung apex.  There was rising thyroglobulin antibody levels as well.  She therefore underwent a second course of radioiodine therapy with a dose of 150 mCi I-131.     She was continued on levothyroxine.  PET/CT in 2019 had shown persistent focal uptake in the right lung apex as well as rising thyroglobulin activity.  However PET/CT fusion study  showed no definite evidence of active thyroid cancer.  There were some hypermetabolic left hilar and AP window lymph nodes that [facility] given the extensive granulomatous disease seen in the lung fields bilaterally.    She last saw ENT in  but has been taking her levothyroxine regularly with her PCP.  However I do not see any recent thyroglobulin antibody tests.  She presented to ENT in July with a palpable nodule in the right neck.  Ultrasound on 2024 showed a new 1.6 cm ovoid hypoechoic solid mass in the anterior neck thought to represent an abnormal enlarged lymph node.  Was recommended a radioiodine uptake study which she has not yet had done.    2024 the nodule enlarged and she underwent a CT scan that showed s/p thyroidectomy along with a 1.6 cm enhancing nodule in the right anterior neck that was worrisome for papillary thyroid cancer recurrence.  There was stable extensive right apical scarring.    2024 PET showed hypermetabolic nodes in the neck soft tissues as well as hypermetabolic mediastinal left perihilar lymph nodes all concerning for metastatic involvement.    25 she underwent a radioiodine uptake study that showed the PET positive lesions are not iodine avid.  Mild uptake in the superior right hemithorax was seen which was unchanged from 2019    2025 the patient underwent right neck mass from the prior thyroidectomy side that showed papillary thyroid carcinoma. NGS/MSI testing is pending    Past Medical History:  Past Medical History:    Other and unspecified hyperlipidemia    TB (tuberculosis)    pt was treated for tb in the     Thyroid cancer (HCC)       Past Surgical History:  Past Surgical History:   Procedure Laterality Date          Thyroidectomy  2015       Family Medical History:  Family History   Problem Relation Age of Onset    Thyroid Cancer Self     Diabetes Mother     Diabetes Sister     Diabetes Brother        Gyne History:  OB History     Para Term  AB Living   4 4 0 0 0 0   SAB IAB Ectopic Multiple Live Births   0 0 0 0 0       Social History:  Social History     Socioeconomic History    Marital status:      Spouse name: Not on file    Number of children: Not on file    Years of education: Not on file    Highest education level: Not on file   Occupational History    Not on file   Tobacco Use    Smoking status: Never    Smokeless tobacco: Never   Vaping Use    Vaping status: Never Used   Substance and Sexual Activity    Alcohol use: No    Drug use: No    Sexual activity: Not on file   Other Topics Concern    Not on file   Social History Narrative    Not on file     Social Drivers of Health     Food Insecurity: Not on file   Transportation Needs: Not on file   Housing Stability: Not on file       Allergies:   Allergies[1]    Current Medications:   Levothyroxine Sodium (SYNTHROID, LEVOTHROID) 125 MCG Oral Tab Take 1 tablet (125 mcg total) by mouth before breakfast.      Atorvastatin Calcium (LIPITOR) 10 MG Oral Tab Take 1 tablet (10 mg total) by mouth nightly.         Review of Systems:  A comprehensive 14 point review of systems was completed.  Pertinent positives and negatives noted in the the HPI.     Vital Signs:  /72 (BP Location: Left arm, Patient Position: Sitting, Cuff Size: adult)   Pulse 74   Temp 98.3 °F (36.8 °C) (Oral)   Resp 18   Ht 1.524 m (5')   Wt 49.2 kg (108 lb 6.4 oz)   SpO2 97%   BMI 21.17 kg/m²     Physical Examination:    General: Patient is alert and oriented x 3, not in acute distress.  Psych:  normal mood and affect  HEENT: EOMs intact. PERRL. Oropharynx is clear.   Neck: No JVD. ~1.5cm nodule in the right neck - stable  Lymphatics: There is no palpable lymphadenopathy throughout in the cervical, supraclavicular or axillary regions.  Chest: Clear to auscultation. No wheezes or rales.  Heart: Regular rate and rhythm. S1S2 normal.  Neurological: Grossly intact.     Performance Status:  ECOG  -0    Labs:    Lab Results   Component Value Date/Time    WBC 6.0 2025 07:21 AM    RBC 4.47 2025 07:21 AM    HGB 12.8 2025 07:21 AM    HCT 39.6 2025 07:21 AM    MCV 88.6 2025 07:21 AM    MCH 28.6 2025 07:21 AM    MCHC 32.3 2025 07:21 AM    RDW 13.2 2025 07:21 AM    NEPRELIM 3.26 2025 07:21 AM    .0 2025 07:21 AM       Lab Results   Component Value Date/Time     (H) 2024 03:47 PM    BUN 12 2024 03:47 PM    CREATSERUM 1.04 (H) 2024 03:47 PM    GFRNAA >60 10/14/2016 09:10 AM    CA 9.8 2024 03:47 PM    ALB 4.8 2024 03:47 PM     2024 03:47 PM    K 3.6 2024 03:47 PM     2024 03:47 PM    CO2 28.0 2024 03:47 PM    ALKPHO 129 2024 03:47 PM    AST 32 2024 03:47 PM    ALT 37 2024 03:47 PM       Imagin/12 CT Neck/chest :1.  There is history of metastatic thyroid cancer.  Post thyroidectomy.  Stable appearance of several subcentimeter lymph nodes within the neck, several of which were hypermetabolic on 24 PET-CT.  Overall no significant change in size or morphology    of these neck nodes.     Multiple mediastinal and hilar lymph nodes are seen measuring up to 1 cm in short axis are unchanged in size and appearance since prior PET-CT.      Chronic scarring and pleural thickening within the right lung apex is unchanged.      2 cm nodule within the posterior superior right lower lobe is also unchanged in suggestive of chronic infection.     Impression:  Diagnosis  1. Recurrent thyroid cancer (HCC)    2. 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 Mediastinal adenopathy      64-year-old female with metastatic recurrence of papillary thyroid cancer in the right neck and left luis-hilar/mediastinal lymph nodes.  She is status post thyroidectomy as well as radioiodine therapy x 2.  Her disease is no longer radioiodine avid.    I had a long discussion  with the patient and her accompanying caregiver and explained that she has disease that is no longer curable.  However this is very treatable with oral TKI therapy.  She has asymptomatic small volume disease that is relatively slow-growing, so careful monitoring with serial scans is also a reasonable option especially given the anticipated side effects of TKI therapy with lenvatinib etc.    Plan:  Her scan shows stable neck lymphadenopathy as well as perihilar and mediastinal lymph nodes.  She does not want to undergo any treatment at this time.  She is encouraged to contact us if she has any untoward symptoms including rapidly enlarging neck nodules or dyspnea  Continue Synthroid suppression.  NGS showed a BRAF V600 mutation so she would be a candidate for dual BRAF/MEK inhibitors if she is unable to tolerate lenvatinib  She will return for follow-up in 3 months with restaging.  Emotional Well Being:    Emotional Well Being discussed, patient aware of support systems available through the Cancer Center. No acute issues.     The diagnosis, prognosis, treatment goals, plan for treatment, and expected response was explained to the patient.       Thank you Dr Mc Seymour MD for the opportunity to participate in the care of this interesting patient. Please do contact me if I may be of any further assistance    Luis Johns MD  [facility] Hematology/Oncology    Copy to: Dr SHER COPPOLA MD     High complexity MDM. Our clinic is continuing focal point for all oncologic services the patient needs.         [1]   Allergies  Allergen Reactions    Iodinated Diagnostic Agents [Radiology Contrast Iodinated Dyes] HIVES     Patient broke out in hives after contrast iv dye was given

## 2025-04-30 LAB
LC THYROGLOBIN LCMS: 0.8 NG/ML
THYROGLOB AB: 81 IU/ML

## 2025-07-16 ENCOUNTER — HOSPITAL ENCOUNTER (OUTPATIENT)
Dept: CT IMAGING | Facility: HOSPITAL | Age: 65
Discharge: HOME | End: 2025-07-16
Attending: INTERNAL MEDICINE
Payer: COMMERCIAL

## 2025-07-16 ENCOUNTER — LAB ENCOUNTER (OUTPATIENT)
Dept: LAB | Facility: HOSPITAL | Age: 65
End: 2025-07-16
Attending: INTERNAL MEDICINE
Payer: COMMERCIAL

## 2025-07-16 DIAGNOSIS — R59.0 MEDIASTINAL ADENOPATHY: ICD-10-CM

## 2025-07-16 DIAGNOSIS — C73 RECURRENT THYROID CANCER (HCC): ICD-10-CM

## 2025-07-16 DIAGNOSIS — C77.0 MALIGNANT NEOPLASM METASTATIC TO LYMPH NODE OF NECK (HCC): ICD-10-CM

## 2025-07-16 LAB — TSI SER-ACNC: 0.83 UIU/ML (ref 0.55–4.78)

## 2025-07-16 PROCEDURE — 71250 CT THORAX DX C-: CPT | Performed by: INTERNAL MEDICINE

## 2025-07-16 PROCEDURE — 86800 THYROGLOBULIN ANTIBODY: CPT

## 2025-07-16 PROCEDURE — 84443 ASSAY THYROID STIM HORMONE: CPT

## 2025-07-16 PROCEDURE — 36415 COLL VENOUS BLD VENIPUNCTURE: CPT

## 2025-07-16 PROCEDURE — 70490 CT SOFT TISSUE NECK W/O DYE: CPT | Performed by: INTERNAL MEDICINE

## 2025-07-21 ENCOUNTER — RESULTS FOLLOW-UP (OUTPATIENT)
Facility: LOCATION | Age: 65
End: 2025-07-21

## 2025-07-21 ENCOUNTER — OFFICE VISIT (OUTPATIENT)
Age: 65
End: 2025-07-21
Attending: INTERNAL MEDICINE
Payer: COMMERCIAL

## 2025-07-21 VITALS
BODY MASS INDEX: 21 KG/M2 | TEMPERATURE: 98 F | OXYGEN SATURATION: 97 % | HEART RATE: 76 BPM | SYSTOLIC BLOOD PRESSURE: 157 MMHG | WEIGHT: 110 LBS | RESPIRATION RATE: 18 BRPM | DIASTOLIC BLOOD PRESSURE: 69 MMHG

## 2025-07-21 DIAGNOSIS — C77.0 MALIGNANT NEOPLASM METASTATIC TO LYMPH NODE OF NECK (HCC): ICD-10-CM

## 2025-07-21 DIAGNOSIS — R91.1 LUNG NODULE: ICD-10-CM

## 2025-07-21 DIAGNOSIS — R59.0 MEDIASTINAL ADENOPATHY: ICD-10-CM

## 2025-07-21 DIAGNOSIS — C73 RECURRENT THYROID CANCER (HCC): Primary | ICD-10-CM

## 2025-07-21 NOTE — PROGRESS NOTES
[facility] Office Note    Patient Name: Dana Carter   YOB: 1960   Medical Record Number: [id number]   CSN: [id number]     The patient speaks Azeri and her daughter serves as  [they refused professional ]    Diagnosis  1. Recurrent thyroid cancer (HCC)    2. 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 Mediastinal adenopathy    4. Lung nodule      INTERVAL HISTORY  Patient returns for follow-up. Since the last visit she has done well.  She denies any significant change in the nodules in the right neck.  No recent weight changes or new bone pain.  She reports some left groin discomfort that she thinks is related to her previously diagnosed small inguinal hernia.    Past Oncologic History   Dana Carter is a 65 year old female that was seen today in the [facility] for recurrent thyroid ca. Her oncologic history is detailed below    2015 She was initially diagnosed with thyroid cancer in 2015 and underwent total thyroidectomy with Dr. Garcia. Her final pathology demonstrated stage T3 papillary thyroid cancer, largest location 3.8cm.  The carcinoma focally involves the left and isthmus inked anterior resection margin.  3 benign lymph nodes were noted.  No evidence of lymphovascular invasion.  Final pathologic stage T3 N0     Her initial surgery was followed by I-131 therapy and received 104 miCu LINARES for treatment.     She was subsequently maintained on levothyroxine with suppressed TSH.     2/2017 radioiodine uptake study showed persistent increased uptake of radioiodine in the lung apex.  There was rising thyroglobulin antibody levels as well.  She therefore underwent a second course of radioiodine therapy with a dose of 150 mCi I-131.     She was continued on levothyroxine.  PET/CT in 2019 had shown persistent focal uptake in the right lung apex as well as rising thyroglobulin activity.  However PET/CT  fusion study showed no definite evidence of active thyroid cancer.  There were some hypermetabolic left hilar and AP window lymph nodes that [facility] given the extensive granulomatous disease seen in the lung fields bilaterally.    She last saw ENT in  but has been taking her levothyroxine regularly with her PCP.  However I do not see any recent thyroglobulin antibody tests.  She presented to ENT in July with a palpable nodule in the right neck.  Ultrasound on 2024 showed a new 1.6 cm ovoid hypoechoic solid mass in the anterior neck thought to represent an abnormal enlarged lymph node.  Was recommended a radioiodine uptake study which she has not yet had done.    2024 the nodule enlarged and she underwent a CT scan that showed s/p thyroidectomy along with a 1.6 cm enhancing nodule in the right anterior neck that was worrisome for papillary thyroid cancer recurrence.  There was stable extensive right apical scarring.    2024 PET showed hypermetabolic nodes in the neck soft tissues as well as hypermetabolic mediastinal left perihilar lymph nodes all concerning for metastatic involvement.    25 she underwent a radioiodine uptake study that showed the PET positive lesions are not iodine avid.  Mild uptake in the superior right hemithorax was seen which was unchanged from 2019    2025 the patient underwent right neck mass from the prior thyroidectomy side that showed papillary thyroid carcinoma. NGS showed a BRAF V600 E mutation.  TMB was low and MSI was stable    Past Medical History:  Past Medical History:    Other and unspecified hyperlipidemia    TB (tuberculosis)    pt was treated for tb in the     Thyroid cancer (HCC)       Past Surgical History:  Past Surgical History:   Procedure Laterality Date          Thyroidectomy  2015       Family Medical History:  Family History   Problem Relation Age of Onset    Thyroid Cancer Self     Diabetes Mother     Diabetes Sister      Diabetes Brother        Gyne History:  OB History    Para Term  AB Living   4 4 0 0 0 0   SAB IAB Ectopic Multiple Live Births   0 0 0 0 0       Social History:  Social History     Socioeconomic History    Marital status:      Spouse name: Not on file    Number of children: Not on file    Years of education: Not on file    Highest education level: Not on file   Occupational History    Not on file   Tobacco Use    Smoking status: Never    Smokeless tobacco: Never   Vaping Use    Vaping status: Never Used   Substance and Sexual Activity    Alcohol use: No    Drug use: No    Sexual activity: Not on file   Other Topics Concern    Not on file   Social History Narrative    Not on file     Social Drivers of Health     Food Insecurity: Not on file   Transportation Needs: Not on file   Housing Stability: Not on file       Allergies:   Allergies[1]    Current Medications:   Levothyroxine Sodium (SYNTHROID, LEVOTHROID) 125 MCG Oral Tab Take 1 tablet (125 mcg total) by mouth before breakfast.      Atorvastatin Calcium (LIPITOR) 10 MG Oral Tab Take 1 tablet (10 mg total) by mouth nightly.         Review of Systems:  A comprehensive 14 point review of systems was completed.  Pertinent positives and negatives noted in the the HPI.     Vital Signs:  /69 (BP Location: Left arm, Patient Position: Sitting, Cuff Size: large)   Pulse 76   Temp 97.9 °F (36.6 °C) (Tympanic)   Resp 18   Wt 49.9 kg (110 lb)   SpO2 97%   BMI 21.48 kg/m²     Physical Examination:    General: Patient is alert and oriented x 3, not in acute distress.  Psych:  normal mood and affect  HEENT: EOMs intact. PERRL. Oropharynx is clear.   Neck: No JVD. ~1.5cm nodule in the right neck - stable  Lymphatics: There is no palpable lymphadenopathy throughout in the cervical, supraclavicular or axillary regions.  Chest: Clear to auscultation. No wheezes or rales.  Heart: Regular rate and rhythm. S1S2 normal.  Neurological: Grossly intact.    Abd: soft, NT, I am unable to palpate any hernia    Performance Status:  ECOG -1    Labs:    Lab Results   Component Value Date/Time    WBC 6.0 02/01/2025 07:21 AM    RBC 4.47 02/01/2025 07:21 AM    HGB 12.8 02/01/2025 07:21 AM    HCT 39.6 02/01/2025 07:21 AM    MCV 88.6 02/01/2025 07:21 AM    MCH 28.6 02/01/2025 07:21 AM    MCHC 32.3 02/01/2025 07:21 AM    RDW 13.2 02/01/2025 07:21 AM    NEPRELIM 3.26 02/01/2025 07:21 AM    .0 02/01/2025 07:21 AM       Lab Results   Component Value Date/Time     (H) 12/03/2024 03:47 PM    BUN 12 12/03/2024 03:47 PM    CREATSERUM 1.04 (H) 12/03/2024 03:47 PM    GFRNAA >60 10/14/2016 09:10 AM    CA 9.8 12/03/2024 03:47 PM    ALB 4.8 12/03/2024 03:47 PM     12/03/2024 03:47 PM    K 3.6 12/03/2024 03:47 PM     12/03/2024 03:47 PM    CO2 28.0 12/03/2024 03:47 PM    ALKPHO 129 12/03/2024 03:47 PM    AST 32 12/03/2024 03:47 PM    ALT 37 12/03/2024 03:47 PM       Imaging:  Preliminary view of CT soft tissue neck and chest shows stable nodular focus in the inferior aspect of the strap muscles as well as in the around the thyroid bed all of which is stable compared to PET scan from 1124    Impression:  Diagnosis  1. Recurrent thyroid cancer (HCC)    2. 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 Mediastinal adenopathy    4. Lung nodule      64-year-old female with metastatic recurrence of papillary thyroid cancer in the right neck and left luis-hilar/mediastinal lymph nodes.  She is status post thyroidectomy as well as radioiodine therapy x 2.  Her disease is no longer radioiodine avid.    I had a long discussion with the patient and her accompanying caregiver and explained that she has disease that is no longer curable.  However this is very treatable with oral TKI therapy.  She has asymptomatic small volume disease that is relatively slow-growing, so careful monitoring with serial scans is also a reasonable option especially given the  anticipated side effects of TKI therapy with lenvatinib etc.    Plan:  Her scan shows stable neck lymphadenopathy as well as perihilar and mediastinal lymph nodes.  She does not want to undergo any treatment at this time.  She is encouraged to contact us if she has any untoward symptoms including rapidly enlarging neck nodules or dyspnea  Continue Synthroid suppression.  NGS showed a BRAF V600 mutation so she would be a candidate for dual BRAF/MEK inhibitors if she is unable to tolerate lenvatinib  She will return for follow-up in 3 months with repeat imaging with PET.    Emotional Well Being:    Emotional Well Being discussed, patient aware of support systems available through the Cancer Center. No acute issues.     The diagnosis, prognosis, treatment goals, plan for treatment, and expected response was explained to the patient.       Thank you Dr Mc Seymour MD for the opportunity to participate in the care of this interesting patient. Please do contact me if I may be of any further assistance    Luis Johns MD  [facility]ematology/Oncology    Copy to: Dr SHER COPPOLA MD     High complexity MDM. Our clinic is continuing focal point for all oncologic services the patient needs.         [1]   Allergies  Allergen Reactions    Iodinated Diagnostic Agents [Radiology Contrast Iodinated Dyes] HIVES     Patient broke out in hives after contrast iv dye was given

## 2025-07-22 NOTE — TELEPHONE ENCOUNTER
I called and spoke to patient regarding CT scan results that show stable lung and neck nodules.  We recommend PET in 3 months and then follow up with Dr. Johns.  Expressed understanding, thankful for the call

## 2025-07-31 LAB
LC THYROGLOBIN LCMS: 2.9 NG/ML
THYROGLOB AB: 70.7 IU/ML

## (undated) NOTE — LETTER
[address]  [address] [facility] [place] [address]      9/26/2018    Dear Mingo Hooper,    It has come to our attention that a required CT CHEST test is due in OCTOBER. This test was ordered by Dr. Alexa Farris.     This test requires a prior

## (undated) NOTE — MR AVS SNAPSHOT
[address]  [address] [facility]  [phone]               Thank you for choosing us for your health care visit with Donna Damon MD.  We are glad to serve you and happy to provide you with this summary o [address] [facility] [address] [facility] [address], [address], [address] [facility]  [address]  [address] [facility],  [address]  [address] [place], [facility] [address] your Zip Code and Date of Birth to complete the sign-up process. If you do not sign up before the expiration date, you must request a new code.     Your unique New Screens Access Code: [email or web address]  Expires: 7/9/2017 11:51 AM    If you have questions, you can ca

## (undated) NOTE — MR AVS SNAPSHOT
[address]  [address] [facility]  [phone]               Thank you for choosing us for your health care visit with Sada March MD.  We are glad to serve you and happy to provide you with this summary o Mini    It is the patient's responsibility to check with and follow their insurance company's guidelines for prior authorization for this test.  You may be held responsible for payment in full if proper authorization is not acqui If you have questions, you can call [phone] to talk to our [facility] Staff. Remember, [employer] is NOT to be used for urgent needs. For medical emergencies, dial 911.            Visit [facility] online at  [email or web address]

## (undated) NOTE — LETTER
[address] Denise  [address] [facility] [place]      12/18/2019    Dear Neftali Moser,    It has come to our attention that a required CT CHEST test is due in January 2020. This test was ordered by Dr. Laura Hills. This test requires a prior authorization. [facility] Department at [phone] will obtain the prior authorization and contact you when it has been approved. You can schedule the test once you receive approval notification. Please call for an appointment at [phone]. If you have any questions please contact our office at [phone].        Thank you,        The Pulmonary Clinical Staff

## (undated) NOTE — LETTER
8/25/2017              Domingo Kocher        [address] [facility], [address] [facility]         Dear Katelynn Palm,    [facility] records indicate that the tests ordered for you by Yisel Victoria MD: Chest CT  have not been done.   If you have, in fac

## (undated) NOTE — LETTER
7/29/2019              [address] [address]         Dear Ivis Nguyen records indicate that the tests ordered for you by Chauncey Hyman MD  have not been done.   If you have, in fact, already compl

## (undated) NOTE — LETTER
1/20/20              [address] [place] [address] [address] [facility]         Dear Ila Schwartz records indicate that the tests ordered for you by Lorenza Jacobo MD  have not been done.   If you have, in fact, already completed the t

## (undated) NOTE — Clinical Note
Thank you  for the opportunity to participate in the care of this interesting patient. Please do contact me if I may be of any further assistance  Luis Johns MD [facility] Hematology/Oncology

## (undated) NOTE — MR AVS SNAPSHOT
[address]  [phone]               Thank you for choosing us for your health care visit with Dorie Rapp.  Ugo Lind MD.  We are glad to serve you and happy to provide you with this summar Patient broke out in hives after contrast iv dye was given                 Today's Vital Signs     BP Temp Height Weight BMI    90/70 mmHg 97.1 °F (36.2 °C) (Tympanic) 5' (1.524 m) 118 lb (53.524 kg) 23.05 kg/m2         Current Medications          This l

## (undated) NOTE — LETTER
9/19/2019              [address] [address]         Dear Hollie Mcneal records indicate that the [address] ordered for you by Wilber Yost MD  have not been done.   If you have, in fact, alr

## (undated) NOTE — LETTER
To Whom It May Concern:    Dana Carter has been under our care regarding ongoing medical issues. Because of this, she has been required to restrict her physical activities.    She may resume her usual activities, including work, on 2/6/25 with the following restrictions:    []  None     [x]    No heavy lifting (over 15 pounds) for  3 DAYS    []    Part-time (no more than             hours per week) for ___ weeks   []  Other:        Please feel free to contact us if there are any questions.      Sincerely,      Luis Holm MD  [facility] INTERVENTIONAL SUITES  [address] [facility] [address]  [phone]        Document generated by:  Karolina BARRIGA RN